# Patient Record
Sex: FEMALE | Race: WHITE | NOT HISPANIC OR LATINO | Employment: FULL TIME | URBAN - NONMETROPOLITAN AREA
[De-identification: names, ages, dates, MRNs, and addresses within clinical notes are randomized per-mention and may not be internally consistent; named-entity substitution may affect disease eponyms.]

---

## 2017-02-13 ENCOUNTER — OFFICE VISIT (OUTPATIENT)
Dept: URGENT CARE | Facility: PHYSICIAN GROUP | Age: 32
End: 2017-02-13

## 2017-02-13 VITALS
TEMPERATURE: 97.2 F | RESPIRATION RATE: 16 BRPM | SYSTOLIC BLOOD PRESSURE: 122 MMHG | BODY MASS INDEX: 27.43 KG/M2 | OXYGEN SATURATION: 97 % | DIASTOLIC BLOOD PRESSURE: 80 MMHG | HEART RATE: 84 BPM | WEIGHT: 150 LBS

## 2017-02-13 DIAGNOSIS — R19.7 DIARRHEA, UNSPECIFIED TYPE: ICD-10-CM

## 2017-02-13 PROCEDURE — 99213 OFFICE O/P EST LOW 20 MIN: CPT | Performed by: PHYSICIAN ASSISTANT

## 2017-02-13 NOTE — MR AVS SNAPSHOT
Hannah Winters   2017 10:35 AM   Office Visit   MRN: [id number]    Department:  [facility]   Dept Phone:  [phone]    Description:  Female : 1985   Provider:  Caren Valdez PA-C           Reason for Visit     Diarrhea           Allergies as of 2017     No Known Allergies      You were diagnosed with     Diarrhea, unspecified type   [[id number]]         Vital Signs     Blood Pressure Pulse Temperature Respirations Weight Oxygen Saturation    122/80 mmHg 84 36.2 °C (97.2 °F) 16 68.04 kg (150 lb) 97%    Smoking Status                   Current Every Day Smoker           Basic Information     Date Of Birth Sex Race Ethnicity Preferred Language    1985 Female White Non- English      Health Maintenance        Date Due Completion Dates    IMM DTaP/Tdap/Td Vaccine (1 - Tdap) 2005 ---    PAP SMEAR 2007 ---    IMM INFLUENZA (1) 2016 ---            Current Immunizations     No immunizations on file.      Below and/or attached are the medications your provider expects you to take. Review all of your home medications and newly ordered medications with your provider and/or pharmacist. Follow medication instructions as directed by your provider and/or pharmacist. Please keep your medication list with you and share with your provider. Update the information when medications are discontinued, doses are changed, or new medications (including over-the-counter products) are added; and carry medication information at all times in the event of emergency situations     Allergies:  No Known Allergies          Medications  Valid as of: 2017 - 11:13 AM    Generic Name Brand Name Tablet Size Instructions for use    Cephalexin (Cap) KEFLEX 500 MG Take 1 Cap by mouth 4 times a day.        Cyclobenzaprine HCl (Tab) FLEXERIL 10 MG Take 1 Tab by mouth 3 times a day as needed for Mild Pain.        Ibuprofen (Tab) MOTRIN 800 MG Take 1 Tab by mouth every 8 hours as needed  for Mild Pain.        Oxycodone-Acetaminophen (Tab) PERCOCET 5-325 MG Take 1-2 Tabs by mouth every four hours as needed.        Oxycodone-Acetaminophen (Tab) PERCOCET 5-325 MG Take 1-2 Tabs by mouth every 6 hours as needed (moderate to severe pain).        Oxycodone-Acetaminophen (Tab) PERCOCET 5-325 MG Take 1-2 Tabs by mouth every 6 hours as needed.        .                 Medicines prescribed today were sent to:     [address], [place] [address] [facility]    [address] [facility] [place] [address]    Phone: [phone] Fax: [phone]    Open 24 Hours?: No      Medication refill instructions:       If your prescription bottle indicates you have medication refills left, it is not necessary to call your provider’s office. Please contact your pharmacy and they will refill your medication.    If your prescription bottle indicates you do not have any refills left, you may request refills at any time through one of the following ways: The online [employer] system (except Urgent Care), by calling your provider’s office, or by asking your pharmacy to contact your provider’s office with a refill request. Medication refills are processed only during regular business hours and may not be available until the next business day. Your provider may request additional information or to have a follow-up visit with you prior to refilling your medication.   *Please Note: Medication refills are assigned a new Rx number when refilled electronically. Your pharmacy may indicate that no refills were authorized even though a new prescription for the same medication is available at the pharmacy. Please request the medicine by name with the pharmacy before contacting your provider for a refill.        Your To Do List     Future Labs/Procedures Complete By Expires    CRYPTO/GIARDIA RAPID ASSAY  As directed 8/13/2017         [employer] Access Code: [email or web address]  Expires: 3/15/2017 11:13 AM    Your email address is  not on file at [facility].  Email Addresses are required for you to sign up for My[employer], please contact [phone] to verify your personal information and to provide your email address prior to attempting to register for MyUnfold.    [place] [address]    [employer]  A secure, online tool to manage your health information     [facility]’s MyUnfold® is a secure, online tool that connects you to your personalized health information from the privacy of your home -- day or night - making it very easy for you to manage your healthcare. Once the activation process is completed, you can even access your medical information using the MyUnfold jena, which is available for free in the Apple Jena store or Google Play store.     To learn more about MyUnfold, visit [email or web address]    There are two levels of access available (as shown below):   My Chart Features  [facility] Primary Care Doctor [facility]  Specialists [facility]  Urgent  Care Non-[facility] Primary Care Doctor   Email your healthcare team securely and privately 24/7 X X X    Manage appointments: schedule your next appointment; view details of past/upcoming appointments X      Request prescription refills. X      View recent personal medical records, including lab and immunizations X X X X   View health record, including health history, allergies, medications X X X X   Read reports about your outpatient visits, procedures, consult and ER notes X X X X   See your discharge summary, which is a recap of your hospital and/or ER visit that includes your diagnosis, lab results, and care plan X X  X     How to register for MyUnfold:  Once your e-mail address has been verified, follow the following steps to sign up for MyUnfold.     1. Go to  [email or web address]  2. Click on the Sign Up Now box, which takes you to the New Member Sign Up page. You will need to provide the following information:  a. Enter your MyUnfold Access Code exactly as  it appears at the top of this page. (You will not need to use this code after you’ve completed the sign-up process. If you do not sign up before the expiration date, you must request a new code.)   b. Enter your date of birth.   c. Enter your home email address.   d. Click Submit, and follow the next screen’s instructions.  3. Create a [employer] ID. This will be your [employer] login ID and cannot be changed, so think of one that is secure and easy to remember.  4. Create a [employer] password. You can change your password at any time.  5. Enter your Password Reset Question and Answer. This can be used at a later time if you forget your password.   6. Enter your e-mail address. This allows you to receive e-mail notifications when new information is available in [employer].  7. Click Sign Up. You can now view your health information.    For assistance activating your [employer] account, call [phone]

## 2017-02-13 NOTE — Clinical Note
February 13, 2017         Patient: Hannah Winters   YOB: 1985   Date of Visit: 2/13/2017           To Whom it May Concern:    Hannah Winters was seen in my clinic on 2/13/2017. She may return to work on 2/14/17.    If you have any questions or concerns, please don't hesitate to call.        Sincerely,           Caren Valdez PA-C  Electronically Signed

## 2017-02-13 NOTE — PROGRESS NOTES
Chief Complaint   Patient presents with   • Diarrhea       HISTORY OF PRESENT ILLNESS: Patient is a 31 y.o. female who presents today for evaluation of a 3 day history of loose stool. Patient states she had very small formed stool yesterday but yesterday evening started having watery stool again. She denies any blood in her stool. She denies recent travel, antibiotics, and bad food or water sources that she knows of. Her boyfriend was sick with the same symptoms one day prior to her symptom onset. She denies localized abdominal pain, nausea, vomiting. She has not tried any over-the-counter medication. Her LMP was partially 3 weeks ago.    There are no active problems to display for this patient.      Allergies:Review of patient's allergies indicates no known allergies.    Current Outpatient Prescriptions Ordered in [facility]   Medication Sig Dispense Refill   • ibuprofen (MOTRIN) 800 MG TABS Take 1 Tab by mouth every 8 hours as needed for Mild Pain. 30 Tab 0   • oxycodone-acetaminophen (PERCOCET) 5-325 MG TABS Take 1-2 Tabs by mouth every 6 hours as needed. 15 Tab 0   • cyclobenzaprine (FLEXERIL) 10 MG TABS Take 1 Tab by mouth 3 times a day as needed for Mild Pain. 15 Tab 0   • oxycodone-acetaminophen (PERCOCET) 5-325 MG TABS Take 1-2 Tabs by mouth every 6 hours as needed (moderate to severe pain). 10 Each 0   • cephALEXin (KEFLEX) 500 MG CAPS Take 1 Cap by mouth 4 times a day. 40 Cap 0   • oxycodone-acetaminophen (PERCOCET) 5-325 MG TABS Take 1-2 Tabs by mouth every four hours as needed. 15 Tab 0     No current Epic-ordered facility-administered medications on file.       History reviewed. No pertinent past medical history.    Social History   Substance Use Topics   • Smoking status: Current Every Day Smoker -- 0.50 packs/day for 6 years     Types: Cigarettes   • Smokeless tobacco: None   • Alcohol Use: No       No family status information on file.   History reviewed. No pertinent family history.    ROS:   Review of  Systems   Constitutional: Negative for fever, chills, weight loss and malaise/fatigue.   HENT: Negative for ear pain, nosebleeds, congestion, sore throat and neck pain.    Eyes: Negative for blurred vision.   Respiratory: Negative for cough, sputum production, shortness of breath and wheezing.    Cardiovascular: Negative for chest pain, palpitations, orthopnea and leg swelling.   Gastrointestinal: Negative for heartburn, nausea, vomiting..   Genitourinary: Negative for dysuria, urgency and frequency.       Exam:  Blood pressure 122/80, pulse 84, temperature 36.2 °C (97.2 °F), resp. rate 16, weight 68.04 kg (150 lb), SpO2 97 %.  General: Normal appearing. No distress.  HEENT:  Head is grossly normal.  Pulmonary: Clear to ausculation and percussion.  Normal effort. No rales, ronchi, or wheezing.   Cardiovascular: Regular rate and rhythm without murmur.    Back: No CVA tenderness noted.  Abdomen: Soft, nondistended, nontender to palpation.  Neurologic: Grossly nonfocal.  Skin: No obvious lesions.  Psych: Normal mood. Alert and oriented x3. Judgment and insight is normal.     Assessment/Plan:  Discussed likely viral etiology. Discussed appropriate over-the-counter symptomatic medication, and when to return to clinic.  Drink plenty of fluids. Stool studies for worsening or persistent symptoms.  1. Diarrhea, unspecified type  CRYPTO/GIARDIA RAPID ASSAY    CULTURE STOOL    C DIFFICILE TOXINS A+B, EIA

## 2017-09-06 ENCOUNTER — HOSPITAL ENCOUNTER (OUTPATIENT)
Dept: HOSPITAL 8 - CFH | Age: 32
Discharge: HOME | End: 2017-09-06
Attending: NURSE PRACTITIONER
Payer: COMMERCIAL

## 2017-09-06 DIAGNOSIS — Z3A.00: ICD-10-CM

## 2017-09-06 DIAGNOSIS — O46.8X1: Primary | ICD-10-CM

## 2017-09-06 PROCEDURE — 76801 OB US < 14 WKS SINGLE FETUS: CPT

## 2018-07-09 ENCOUNTER — HOSPITAL ENCOUNTER (OUTPATIENT)
Dept: HOSPITAL 8 - LDOP | Age: 33
Discharge: HOME | End: 2018-07-09
Attending: OBSTETRICS & GYNECOLOGY
Payer: MEDICAID

## 2018-07-09 VITALS — HEIGHT: 62 IN | WEIGHT: 178.35 LBS | BODY MASS INDEX: 32.82 KG/M2

## 2018-07-09 VITALS — SYSTOLIC BLOOD PRESSURE: 130 MMHG | DIASTOLIC BLOOD PRESSURE: 62 MMHG

## 2018-07-09 DIAGNOSIS — R51: ICD-10-CM

## 2018-07-09 DIAGNOSIS — O26.893: ICD-10-CM

## 2018-07-09 DIAGNOSIS — O13.3: Primary | ICD-10-CM

## 2018-07-09 DIAGNOSIS — Z3A.35: ICD-10-CM

## 2018-07-09 PROCEDURE — 99211 OFF/OP EST MAY X REQ PHY/QHP: CPT

## 2018-07-09 PROCEDURE — G0463 HOSPITAL OUTPT CLINIC VISIT: HCPCS

## 2018-07-09 PROCEDURE — 59025 FETAL NON-STRESS TEST: CPT

## 2018-08-16 ENCOUNTER — HOSPITAL ENCOUNTER (INPATIENT)
Dept: HOSPITAL 8 - LDOP | Age: 33
LOS: 2 days | Discharge: HOME | End: 2018-08-18
Attending: OBSTETRICS & GYNECOLOGY | Admitting: OBSTETRICS & GYNECOLOGY
Payer: MEDICAID

## 2018-08-16 VITALS — WEIGHT: 167.33 LBS | BODY MASS INDEX: 30.79 KG/M2 | HEIGHT: 62 IN

## 2018-08-16 VITALS — DIASTOLIC BLOOD PRESSURE: 69 MMHG | SYSTOLIC BLOOD PRESSURE: 119 MMHG

## 2018-08-16 VITALS — DIASTOLIC BLOOD PRESSURE: 72 MMHG | SYSTOLIC BLOOD PRESSURE: 153 MMHG

## 2018-08-16 VITALS — SYSTOLIC BLOOD PRESSURE: 153 MMHG | DIASTOLIC BLOOD PRESSURE: 80 MMHG

## 2018-08-16 DIAGNOSIS — Z83.3: ICD-10-CM

## 2018-08-16 DIAGNOSIS — Z3A.41: ICD-10-CM

## 2018-08-16 DIAGNOSIS — O34.211: Primary | ICD-10-CM

## 2018-08-16 DIAGNOSIS — O48.0: ICD-10-CM

## 2018-08-16 DIAGNOSIS — Z80.3: ICD-10-CM

## 2018-08-16 LAB
BASOPHILS # BLD AUTO: 0.09 X10^3/UL (ref 0–0.1)
BASOPHILS NFR BLD AUTO: 1 % (ref 0–1)
EOSINOPHIL # BLD AUTO: 0 X10^3/UL (ref 0–0.4)
EOSINOPHIL NFR BLD AUTO: 0 % (ref 1–7)
ERYTHROCYTE [DISTWIDTH] IN BLOOD BY AUTOMATED COUNT: 13.7 % (ref 9.6–15.2)
LYMPHOCYTES # BLD AUTO: 1.82 X10^3/UL (ref 1–3.4)
LYMPHOCYTES NFR BLD AUTO: 16 % (ref 22–44)
MCH RBC QN AUTO: 30.7 PG (ref 27–34.8)
MCHC RBC AUTO-ENTMCNC: 33.5 G/DL (ref 32.4–35.8)
MCV RBC AUTO: 91.7 FL (ref 80–100)
MD: NO
MONOCYTES # BLD AUTO: 0.49 X10^3/UL (ref 0.2–0.8)
MONOCYTES NFR BLD AUTO: 4 % (ref 2–9)
NEUTROPHILS # BLD AUTO: 9.32 X10^3/UL (ref 1.8–6.8)
NEUTROPHILS NFR BLD AUTO: 80 % (ref 42–75)
PLATELET # BLD AUTO: 164 X10^3/UL (ref 130–400)
PMV BLD AUTO: 10.4 FL (ref 7.4–10.4)
RBC # BLD AUTO: 4.02 X10^6/UL (ref 3.82–5.3)

## 2018-08-16 PROCEDURE — 36415 COLL VENOUS BLD VENIPUNCTURE: CPT

## 2018-08-16 PROCEDURE — 85025 COMPLETE CBC W/AUTO DIFF WBC: CPT

## 2018-08-16 PROCEDURE — 86900 BLOOD TYPING SEROLOGIC ABO: CPT

## 2018-08-16 PROCEDURE — 82803 BLOOD GASES ANY COMBINATION: CPT

## 2018-08-16 PROCEDURE — 86850 RBC ANTIBODY SCREEN: CPT

## 2018-08-16 RX ADMIN — KETOROLAC TROMETHAMINE SCH MG: 30 INJECTION, SOLUTION INTRAMUSCULAR at 23:10

## 2018-08-16 RX ADMIN — SODIUM CHLORIDE, SODIUM LACTATE, POTASSIUM CHLORIDE, AND CALCIUM CHLORIDE SCH MLS/HR: .6; .31; .03; .02 INJECTION, SOLUTION INTRAVENOUS at 14:30

## 2018-08-16 RX ADMIN — HYDROCODONE BITARTRATE AND ACETAMINOPHEN PRN TAB: 5; 325 TABLET ORAL at 22:01

## 2018-08-16 RX ADMIN — Medication SCH MLS/HR: at 20:31

## 2018-08-16 RX ADMIN — SODIUM CHLORIDE, SODIUM LACTATE, POTASSIUM CHLORIDE, AND CALCIUM CHLORIDE SCH MLS/HR: .6; .31; .03; .02 INJECTION, SOLUTION INTRAVENOUS at 20:31

## 2018-08-16 RX ADMIN — SODIUM CHLORIDE, SODIUM LACTATE, POTASSIUM CHLORIDE, AND CALCIUM CHLORIDE SCH MLS/HR: .6; .31; .03; .02 INJECTION, SOLUTION INTRAVENOUS at 18:09

## 2018-08-16 RX ADMIN — KETOROLAC TROMETHAMINE SCH MG: 30 INJECTION, SOLUTION INTRAMUSCULAR at 20:15

## 2018-08-17 VITALS — DIASTOLIC BLOOD PRESSURE: 75 MMHG | SYSTOLIC BLOOD PRESSURE: 149 MMHG

## 2018-08-17 VITALS — DIASTOLIC BLOOD PRESSURE: 71 MMHG | SYSTOLIC BLOOD PRESSURE: 124 MMHG

## 2018-08-17 VITALS — SYSTOLIC BLOOD PRESSURE: 127 MMHG | DIASTOLIC BLOOD PRESSURE: 60 MMHG

## 2018-08-17 VITALS — DIASTOLIC BLOOD PRESSURE: 83 MMHG | SYSTOLIC BLOOD PRESSURE: 135 MMHG

## 2018-08-17 VITALS — DIASTOLIC BLOOD PRESSURE: 74 MMHG | SYSTOLIC BLOOD PRESSURE: 152 MMHG

## 2018-08-17 LAB
BASOPHILS # BLD AUTO: 0.05 X10^3/UL (ref 0–0.1)
BASOPHILS NFR BLD AUTO: 0 % (ref 0–1)
EOSINOPHIL # BLD AUTO: 0 X10^3/UL (ref 0–0.4)
EOSINOPHIL NFR BLD AUTO: 0 % (ref 1–7)
ERYTHROCYTE [DISTWIDTH] IN BLOOD BY AUTOMATED COUNT: 13.8 % (ref 9.6–15.2)
LYMPHOCYTES # BLD AUTO: 2.29 X10^3/UL (ref 1–3.4)
LYMPHOCYTES NFR BLD AUTO: 16 % (ref 22–44)
MCH RBC QN AUTO: 32.1 PG (ref 27–34.8)
MCHC RBC AUTO-ENTMCNC: 34.5 G/DL (ref 32.4–35.8)
MCV RBC AUTO: 92.9 FL (ref 80–100)
MD: NO
MONOCYTES # BLD AUTO: 0.58 X10^3/UL (ref 0.2–0.8)
MONOCYTES NFR BLD AUTO: 4 % (ref 2–9)
NEUTROPHILS # BLD AUTO: 11.81 X10^3/UL (ref 1.8–6.8)
NEUTROPHILS NFR BLD AUTO: 80 % (ref 42–75)
PLATELET # BLD AUTO: 140 X10^3/UL (ref 130–400)
PMV BLD AUTO: 10 FL (ref 7.4–10.4)
RBC # BLD AUTO: 3.43 X10^6/UL (ref 3.82–5.3)

## 2018-08-17 RX ADMIN — KETOROLAC TROMETHAMINE SCH MG: 30 INJECTION, SOLUTION INTRAMUSCULAR at 11:43

## 2018-08-17 RX ADMIN — SODIUM CHLORIDE, SODIUM LACTATE, POTASSIUM CHLORIDE, AND CALCIUM CHLORIDE SCH MLS/HR: .6; .31; .03; .02 INJECTION, SOLUTION INTRAVENOUS at 04:31

## 2018-08-17 RX ADMIN — Medication SCH MLS/HR: at 16:31

## 2018-08-17 RX ADMIN — KETOROLAC TROMETHAMINE SCH MG: 30 INJECTION, SOLUTION INTRAMUSCULAR at 17:49

## 2018-08-17 RX ADMIN — Medication SCH MLS/HR: at 06:31

## 2018-08-17 RX ADMIN — HYDROCODONE BITARTRATE AND ACETAMINOPHEN PRN TAB: 5; 325 TABLET ORAL at 20:05

## 2018-08-17 RX ADMIN — SODIUM CHLORIDE, SODIUM LACTATE, POTASSIUM CHLORIDE, AND CALCIUM CHLORIDE SCH MLS/HR: .6; .31; .03; .02 INJECTION, SOLUTION INTRAVENOUS at 06:31

## 2018-08-17 RX ADMIN — HYDROCODONE BITARTRATE AND ACETAMINOPHEN PRN TAB: 5; 325 TABLET ORAL at 03:54

## 2018-08-17 RX ADMIN — HYDROCODONE BITARTRATE AND ACETAMINOPHEN PRN TAB: 5; 325 TABLET ORAL at 12:03

## 2018-08-17 RX ADMIN — HYDROCODONE BITARTRATE AND ACETAMINOPHEN PRN TAB: 5; 325 TABLET ORAL at 07:59

## 2018-08-17 RX ADMIN — KETOROLAC TROMETHAMINE SCH MG: 30 INJECTION, SOLUTION INTRAMUSCULAR at 05:22

## 2018-08-17 RX ADMIN — DOCUSATE SODIUM PRN MG: 100 CAPSULE, LIQUID FILLED ORAL at 20:05

## 2018-08-17 RX ADMIN — DOCUSATE SODIUM PRN MG: 100 CAPSULE, LIQUID FILLED ORAL at 07:59

## 2018-08-17 RX ADMIN — PRENATAL VIT W/ FE FUMARATE-FA TAB 27-0.8 MG SCH EACH: 27-0.8 TAB at 07:59

## 2018-08-17 RX ADMIN — SODIUM CHLORIDE, SODIUM LACTATE, POTASSIUM CHLORIDE, AND CALCIUM CHLORIDE SCH MLS/HR: .6; .31; .03; .02 INJECTION, SOLUTION INTRAVENOUS at 16:31

## 2018-08-17 RX ADMIN — KETOROLAC TROMETHAMINE SCH MG: 30 INJECTION, SOLUTION INTRAMUSCULAR at 23:31

## 2018-08-17 RX ADMIN — SODIUM CHLORIDE, SODIUM LACTATE, POTASSIUM CHLORIDE, AND CALCIUM CHLORIDE SCH MLS/HR: .6; .31; .03; .02 INJECTION, SOLUTION INTRAVENOUS at 12:31

## 2018-08-18 VITALS — DIASTOLIC BLOOD PRESSURE: 70 MMHG | SYSTOLIC BLOOD PRESSURE: 132 MMHG

## 2018-08-18 RX ADMIN — KETOROLAC TROMETHAMINE SCH MG: 30 INJECTION, SOLUTION INTRAMUSCULAR at 05:05

## 2018-08-18 RX ADMIN — PRENATAL VIT W/ FE FUMARATE-FA TAB 27-0.8 MG SCH EACH: 27-0.8 TAB at 09:26

## 2018-08-18 RX ADMIN — KETOROLAC TROMETHAMINE SCH MG: 30 INJECTION, SOLUTION INTRAMUSCULAR at 11:00

## 2018-08-18 RX ADMIN — HYDROCODONE BITARTRATE AND ACETAMINOPHEN PRN TAB: 5; 325 TABLET ORAL at 05:05

## 2018-08-18 RX ADMIN — HYDROCODONE BITARTRATE AND ACETAMINOPHEN PRN TAB: 5; 325 TABLET ORAL at 13:19

## 2018-08-18 RX ADMIN — DOCUSATE SODIUM PRN MG: 100 CAPSULE, LIQUID FILLED ORAL at 09:26

## 2019-05-17 ENCOUNTER — OCCUPATIONAL MEDICINE (OUTPATIENT)
Dept: URGENT CARE | Facility: PHYSICIAN GROUP | Age: 34
End: 2019-05-17
Payer: MEDICAID

## 2019-05-17 ENCOUNTER — APPOINTMENT (OUTPATIENT)
Dept: RADIOLOGY | Facility: IMAGING CENTER | Age: 34
End: 2019-05-17
Attending: NURSE PRACTITIONER
Payer: MEDICAID

## 2019-05-17 VITALS
DIASTOLIC BLOOD PRESSURE: 76 MMHG | OXYGEN SATURATION: 97 % | HEIGHT: 62 IN | WEIGHT: 181 LBS | HEART RATE: 67 BPM | SYSTOLIC BLOOD PRESSURE: 124 MMHG | TEMPERATURE: 98.2 F | BODY MASS INDEX: 33.31 KG/M2 | RESPIRATION RATE: 16 BRPM

## 2019-05-17 DIAGNOSIS — S96.911A STRAIN OF RIGHT ANKLE AND FOOT, INITIAL ENCOUNTER: ICD-10-CM

## 2019-05-17 DIAGNOSIS — M79.671 RIGHT FOOT PAIN: ICD-10-CM

## 2019-05-17 DIAGNOSIS — Z02.1 PRE-EMPLOYMENT DRUG SCREENING: ICD-10-CM

## 2019-05-17 LAB
AMP AMPHETAMINE: NORMAL
BREATH ALCOHOL COMMENT: NORMAL
COC COCAINE: NORMAL
INT CON NEG: NORMAL
INT CON POS: NORMAL
MET METHAMPHETAMINES: NORMAL
OPI OPIATES: NORMAL
PCP PHENCYCLIDINE: NORMAL
POC BREATHALIZER: 0 PERCENT (ref 0–0.01)
POC DRUG COMMENT 753798-OCCUPATIONAL HEALTH: NORMAL
THC: NORMAL

## 2019-05-17 PROCEDURE — 99214 OFFICE O/P EST MOD 30 MIN: CPT | Performed by: NURSE PRACTITIONER

## 2019-05-17 PROCEDURE — 82075 ASSAY OF BREATH ETHANOL: CPT | Performed by: NURSE PRACTITIONER

## 2019-05-17 PROCEDURE — 80305 DRUG TEST PRSMV DIR OPT OBS: CPT | Performed by: NURSE PRACTITIONER

## 2019-05-17 PROCEDURE — 73610 X-RAY EXAM OF ANKLE: CPT | Mod: TC,RT | Performed by: PHYSICIAN ASSISTANT

## 2019-05-17 ASSESSMENT — ENCOUNTER SYMPTOMS
WHEEZING: 0
CHILLS: 0
FEVER: 0
ROS SKIN COMMENTS: NO AREA OF ABRASION
SHORTNESS OF BREATH: 0
FALLS: 0

## 2019-05-17 ASSESSMENT — PAIN SCALES - GENERAL: PAINLEVEL: 7=MODERATE-SEVERE PAIN

## 2019-05-17 NOTE — LETTER
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    Medical Information   Related to Industrial Injury? No    Subjective Complaints:  DOI 5/16/2019: Patient states was walking at work and lateral edge of her right foot suddenly started hurting. Denies injury mechanism and denies rolling ankle or falling. States pain starts from lateral edge of right foot and radiates to right lateral ankle and heel. States with mild tingling at times. Has not tried anything for relief. Denies second job or prior injury to the area. Pain worsened when walking for prolonged periods.     Objective Findings: Right foot/ankle: ROM right ankle normal. No area of redness, swelling or bruising noted. Cap refill <2. Tenderness to palpation lateral aspect of right heel and dorsal surface of foot. Mild pain in heel with plantar flexion.      Pre-Existing Condition(s): None   Assessment:   Initial Visit    Status: Additional Care Required  Permanent Disability:No    Plan: Diagnostics    Diagnostics: X-ray    Comments:  Xray with no acute fracture. With mild soft tissue swelling.    Disability Information   Status: Released to Restricted Duty    From:  5/17/2019  Through: 5/20/2019 Restrictions are: Temporary   Physical Restrictions   Sitting:    Standing:  < or = to 6 hrs/day Stooping:    Bending:      Squatting:    Walking:  < or = to 6 hrs/day  Climbing:  < or = to 4 hrs/day Pushing:      Pulling:    Other:    Reaching Above Shoulder (L):   Reaching Above Shoulder (R):       Reaching Below Shoulder (L):    Reaching Below Shoulder (R):      Not to exceed Weight Limits   Carrying(hrs):   Weight Limit(lb):   Lifting(hrs):   Weight  Limit(lb):     Comments: Advised to rest, ice, and elevate extremity. Must wear ace wrap while working and advised to obtain shoes with arch support, as this could be contributing factor.  May take over-the-counter Ibuprofen 400-800 mg every 8 hours as needed for pain      Repetitive Actions   Hands: i.e. Fine Manipulations from Grasping:     Feet: i.e. Operating Foot Controls:     Driving / Operate Machinery:     Physician Name: CJ Dorsey Physician Signature: KAYE Preciado e-Signature: Dr. Minor Martinez, Medical Director   Clinic Name / Location: [address] [address] Clinic Phone Number: Dept: [phone]   Appointment Time: 11:30 Am Visit Start Time: 11:47 AM   Check-In Time:  11:43 Am Visit Discharge Time:  1:11pm   Original-Treating Physician or Chiropractor    Page 2-Insurer/TPA    Page 3-Employer    Page 4-Employee

## 2019-05-17 NOTE — PROGRESS NOTES
Subjective:   Hannah Winters is a 33 y.o. female who presents for Ankle Injury (NEW WC )    DOI 5/16/2019: Patient states was walking at work and lateral edge of her right foot suddenly started hurting. Denies injury mechanism and denies rolling ankle or falling. States pain starts from lateral edge of right foot and radiates to right lateral ankle and heel. States with mild tingling at times. Has not tried anything for relief. Denies second job or prior injury to the area. Pain worsened when walking for prolonged periods.     HPI     Review of Systems   Constitutional: Negative for chills and fever.   Respiratory: Negative for shortness of breath and wheezing.    Cardiovascular: Negative for chest pain.   Musculoskeletal: Negative for falls and joint pain.        Right foot/heel pain   Skin:        No area of abrasion     PMH:  has no past medical history on file.  MEDS:   Current Outpatient Prescriptions:   •  ibuprofen (MOTRIN) 800 MG TABS, Take 1 Tab by mouth every 8 hours as needed for Mild Pain., Disp: 30 Tab, Rfl: 0  •  oxycodone-acetaminophen (PERCOCET) 5-325 MG TABS, Take 1-2 Tabs by mouth every 6 hours as needed., Disp: 15 Tab, Rfl: 0  •  cyclobenzaprine (FLEXERIL) 10 MG TABS, Take 1 Tab by mouth 3 times a day as needed for Mild Pain., Disp: 15 Tab, Rfl: 0  •  oxycodone-acetaminophen (PERCOCET) 5-325 MG TABS, Take 1-2 Tabs by mouth every 6 hours as needed (moderate to severe pain)., Disp: 10 Each, Rfl: 0  •  cephALEXin (KEFLEX) 500 MG CAPS, Take 1 Cap by mouth 4 times a day., Disp: 40 Cap, Rfl: 0  •  oxycodone-acetaminophen (PERCOCET) 5-325 MG TABS, Take 1-2 Tabs by mouth every four hours as needed., Disp: 15 Tab, Rfl: 0  ALLERGIES: No Known Allergies  SURGHX: No past surgical history on file.  SOCHX:  reports that she has been smoking Cigarettes.  She has a 3.00 pack-year smoking history. She does not have any smokeless tobacco history on file. She reports that she does not drink alcohol or use  "drugs.  FH: Family history was reviewed, no pertinent findings to report     Objective:   /76   Pulse 67   Temp 36.8 °C (98.2 °F) (Temporal)   Resp 16   Ht 1.575 m (5' 2\")   Wt 82.1 kg (181 lb)   SpO2 97%   BMI 33.11 kg/m²   Physical Exam   Constitutional: She appears well-developed and well-nourished. No distress.   HENT:   Head: Normocephalic.   Right Ear: Hearing normal.   Left Ear: Hearing normal.   Nose: Nose normal.   Eyes: Pupils are equal, round, and reactive to light. Conjunctivae, EOM and lids are normal.   Neck: Normal range of motion.   Cardiovascular: Normal rate, regular rhythm and normal heart sounds.    Pulmonary/Chest: Effort normal and breath sounds normal. No respiratory distress. She has no decreased breath sounds. She has no wheezes.   Musculoskeletal:        Right foot: There is tenderness. There is normal range of motion and no swelling.        Feet:    See comments below   Neurological: She is alert.   Skin: Skin is warm. Capillary refill takes less than 2 seconds. No abrasion and no rash noted. She is not diaphoretic.   NO area of abrasion   Psychiatric: She has a normal mood and affect. Her speech is normal and behavior is normal. Judgment and thought content normal.   Vitals reviewed.    Right foot/ankle: ROM right ankle normal. No area of redness, swelling or bruising noted. Cap refill <2. Tenderness to palpation lateral aspect of right heel and dorsal surface of foot. Mild pain in heel with plantar flexion.      Assessment/Plan:   Assessment    1. Right foot pain  - DX-ANKLE 3+ VIEWS RIGHT; Future  - POCT Breath Alcohol Test    2. Pre-employment drug screening  - POCT 6 Panel Urine Drug Screen  - POCT Breath Alcohol Test    3. Strain of right ankle and foot, initial encounter    Xray negative for acute fracture, with soft tissue swelling  Restrictions per D39  May take over-the-counter Ibuprofen 400-800 mg every 8 hours as needed for pain  Ace wrap    Differential diagnosis, " natural history, supportive care, and indications for immediate follow-up discussed.

## 2019-05-17 NOTE — LETTER
"EMPLOYEE’S CLAIM FOR COMPENSATION/ REPORT OF INITIAL TREATMENT  FORM C-4    EMPLOYEE’S CLAIM - PROVIDE ALL INFORMATION REQUESTED   First Name  Hannah Last Name  Allshmary Birthdate                    1985                Sex  female Claim Number   Home Address  [address] Age  33 y.o. 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 (if applicable)  Picking-pushing cart    How did this injury or occupational disease occur? (Be specific an answer in detail. Use additional sheet if necessary)  I was picking not sure when or how I sprained my ankle   If you believe that you have an occupational disease, when did you first have knowledge of the disability and it relationship to your employment?  No Witnesses to the Accident  None      Nature of Injury or Occupational Disease  Sprain  Part(s) of Body Injured or Affected  Ankle (R), Foot (R),     I certify that the above is true and correct to the best of my knowledge and that I have provided this information in order to obtain the benefits of [place] inclusive or Chapter 617 of [place]).  I hereby authorize any physician, chiropractor, surgeon, practitioner, or other person, any hospital, including [facility] or [facility] hospital, any medical service organization, any insurance company, or other institution or organization to release to each other, any medical or other information, including benefits paid or payable, pertinent to this injury or disease, except information relative to diagnosis, treatment and/or counseling for AIDS, psychological conditions, alcohol or controlled substances, for which I must give specific authorization.  A Photostat of this authorization shall be as valid as the original.     Date   Place   Employee’s Signature   THIS REPORT MUST BE COMPLETED AND MAILED WITHIN 3 WORKING DAYS OF TREATMENT   Place  [facility]  Name of Facility  [place]   Date  5/17/2019 Diagnosis  (M79.671) Right foot pain  (Z02.1) Pre-employment drug screening  (S96.911A) Strain of right ankle and foot, initial encounter Is there evidence the injured employee was under the influence of alcohol and/or another controlled substance at the time of accident?   Hour  11:47 AM Description of Injury or Disease  Diagnoses of Right foot pain, Pre-employment drug screening, and Strain of right ankle and foot, initial encounter were pertinent to this visit. No   Treatment  Advised to rest, ice, and elevate extremity. Must wear ace wrap while working and advised to obtain shoes with arch support, as this could be contributing factor.  May take over-the-counter Ibuprofen 400-800 mg every 8 hours as needed for pain  Have you advised the patient to remain off work five days or more? No   X-Ray Findings  Negative   If Yes   From Date  To Date      From information given by the employee, together with medical evidence, can you directly connect this injury or occupational disease as job incurred?  No If No Full Duty  No Modified Duty  Yes    "  Is additional medical care by a physician indicated?  Yes If Modified Duty, Specify any Limitations / Restrictions      Do you know of any previous injury or disease contributing to this condition or occupational disease?                            No   Date  5/17/2019 Print Doctor’s Name CJ Dorsey I certify the employer’s copy of  this form was mailed on:   Address  [address] Insurer’s Use Only     [place]  [address]    Provider’s Tax ID Number  [id number] Telephone  Dept: [phone]        christelle-KAYE Rich   e-Signature: Dr. Minor Martinez, Medical Director Degree  APRN        ORIGINAL-TREATING PHYSICIAN OR CHIROPRACTOR    PAGE 2-INSURER/TPA    PAGE 3-EMPLOYER    PAGE 4-EMPLOYEE             Form C-4 (rev10/07)              BRIEF DESCRIPTION OF RIGHTS AND BENEFITS  (Pursuant to NRS 616C.050)    Notice of Injury or Occupational Disease (Incident Report Form C-1): If an injury or occupational disease (OD) arises out of and in the  course of employment, you must provide written notice to your employer as soon as practicable, but no later than 7 days after the accident or  OD. Your employer shall maintain a sufficient supply of the required forms.    Claim for Compensation (Form C-4): If medical treatment is sought, the form C-4 is available at the place of initial treatment. A completed  \"Claim for Compensation\" (Form C-4) must be filed within 90 days after an accident or OD. The treating physician or chiropractor must,  within 3 working days after treatment, complete and mail to the employer, the employer's insurer and third-party , the Claim for  Compensation.    Medical Treatment: If you require medical treatment for your on-the-job injury or OD, you may be required to select a physician or  chiropractor from a list provided by your workers’ compensation insurer, if it has contracted with an Organization for Managed Care (MCO) " or  Preferred Provider Organization (PPO) or providers of health care. If your employer has not entered into a contract with an MCO or PPO, you  may select a physician or chiropractor from the Panel of Physicians and Chiropractors. Any medical costs related to your industrial injury or  OD will be paid by your insurer.    Temporary Total Disability (TTD): If your doctor has certified that you are unable to work for a period of at least 5 consecutive days, or 5  cumulative days in a 20-day period, or places restrictions on you that your employer does not accommodate, you may be entitled to TTD  compensation.    Temporary Partial Disability (TPD): If the wage you receive upon reemployment is less than the compensation for TTD to which you are  entitled, the insurer may be required to pay you TPD compensation to make up the difference. TPD can only be paid for a maximum of 24  months.    Permanent Partial Disability (PPD): When your medical condition is stable and there is an indication of a PPD as a result of your injury or  OD, within 30 days, your insurer must arrange for an evaluation by a rating physician or chiropractor to determine the degree of your PPD. The  amount of your PPD award depends on the date of injury, the results of the PPD evaluation and your age and wage.    Permanent Total Disability (PTD): If you are medically certified by a treating physician or chiropractor as permanently and totally disabled  and have been granted a PTD status by your insurer, you are entitled to receive monthly benefits not to exceed 66 2/3% of your average  monthly wage. The amount of your PTD payments is subject to reduction if you previously received a PPD award.    Vocational Rehabilitation Services: You may be eligible for vocational rehabilitation services if you are unable to return to the job due to a  permanent physical impairment or permanent restrictions as a result of your injury or occupational  disease.    Transportation and Per Ghassan Reimbursement: You may be eligible for travel expenses and per ghassan associated with medical treatment.    Reopening: You may be able to reopen your claim if your condition worsens after claim closure.    Appeal Process: If you disagree with a written determination issued by the insurer or the insurer does not respond to your request, you may  appeal to the Department of Administration, , by following the instructions contained in your determination letter. You must  appeal the determination within 70 days from the date of the determination letter at [address], [address], [place], [place]  [address], or [address], [address], [place], [place] [address]. If you disagree with the  decision, you may appeal to the  Department of Administration, . You must file your appeal within 30 days from the date of the  decision  letter at [address], [address], [place], [place] [address], or [address], [place], [place] [address]. If you  disagree with a decision of an , you may file a petition for judicial review with the District Court. You must do so within 30  days of the Appeal Officer’s decision. You may be represented by an  at your own expense or you may contact the [facility] for possible  representation.    [place]  for Injured Workers (NAIW): If you disagree with a  decision, you may request that NAIW represent you  without charge at an  Hearing. For information regarding denial of benefits, you may contact the [facility] at: [address], [address], [place] [address], [phone], or [address] [address], [phone]    To File a Complaint with the Division: If you wish to file a complaint with the  of the Division of Industrial Relations (DIR),  please contact  the Workers’ Compensation Section, [address], [address], [place], [place] [address], telephone (625) [phone], or  [address], [address], [place], [place] [address], telephone [phone]) [phone].    For assistance with Workers’ Compensation Issues: you may contact the Office of the [facility] Consumer Health Assistance, [address] [place] [address], [address], [place], [place] [address], Toll Free 1-392.461.3791, Web site: http://govcha.[facility].nv., E-mail  Kimberly@[facility].nv.                                                                                                                                                                                                                                   __________________________________________________________________                                                                   _________________                Employee Name / Signature                                                                                                                                                       Date                                                                                                                                                                                                     D-2 (rev. 10/07)

## 2019-05-20 ENCOUNTER — OCCUPATIONAL MEDICINE (OUTPATIENT)
Dept: URGENT CARE | Facility: PHYSICIAN GROUP | Age: 34
End: 2019-05-20
Payer: MEDICAID

## 2019-05-20 VITALS
DIASTOLIC BLOOD PRESSURE: 76 MMHG | OXYGEN SATURATION: 99 % | RESPIRATION RATE: 14 BRPM | BODY MASS INDEX: 33.49 KG/M2 | TEMPERATURE: 98.4 F | WEIGHT: 182 LBS | HEART RATE: 88 BPM | HEIGHT: 62 IN | SYSTOLIC BLOOD PRESSURE: 128 MMHG

## 2019-05-20 DIAGNOSIS — S96.911D STRAIN OF RIGHT FOOT, SUBSEQUENT ENCOUNTER: ICD-10-CM

## 2019-05-20 PROCEDURE — 99213 OFFICE O/P EST LOW 20 MIN: CPT | Performed by: PHYSICIAN ASSISTANT

## 2019-05-20 ASSESSMENT — PAIN SCALES - GENERAL: PAINLEVEL: 6=MODERATE PAIN

## 2019-05-20 NOTE — LETTER
"   [facility]  [address] [facility] [facility] [place] [address]  Phone:  [phone] - Fax:  [phone]   [facility] Progress Report and Disability Certification  Date of Service: 5/20/2019   No Show:  No  Date / Time of Next Visit: 5/24/2019   Claim Information   Patient Name: Hannah Winters  Claim Number:     Employer: [employer]  Date of Injury: 5/16/2019     Insurer / TPA: Eliud [employer]  ID / SSN:     Occupation: [employer]  Diagnosis: The encounter diagnosis was Strain of right foot, subsequent encounter.    Medical Information   Related to Industrial Injury?   Comments:no PMH, no memorable injury but w/ swelling and pt is adamant, suspect did roll ankle at work      Subjective Complaints:  DOI: 5/17/2019  Pt notes suspected ankle rolling injury at work, she denies memorable injury but notes \" I did not have the pain before I came to work that day\".  She denies past medical history of ankle surgery or injury.  She denies significant or memorable traumatic injury.  She approximates 45% improvement of her last 3 days.  She has been \"resting in bed\".  She took some ibuprofen.  She has not tried icing.  She has been compliant with Ace wrap and work restrictions.  She denies other employment currently.   Objective Findings: Gen: AOx3; Head: NC AT; Eyes: PERRLA/EOM; Lungs: NLR; Cardiac: RR by periph pulse exam; right ankle: Grossly normal with possible trace effusion, no erythema ecchymosis or edema noted, full active range of motion, normal strength to resistance, mild lateral sided bimalleolar and ATFL tenderness to palpation, no medial or fifth metatarsal head tenderness to palpation; neuro: N VID, brisk capillary refill throughout, normal sensation to light touch, +2 dorsalis pedis pulse   Pre-Existing Condition(s):     Assessment:   Condition Improved    Status: Additional Care Required  Permanent Disability:No    Plan:   Comments:Restricted duty with " limited standing and walking to 6 hours daily, over-the-counter anti-inflammatories, ice, rest, follow-up in 5 days for repeat evaluation     Diagnostics:      Comments:       Disability Information   Status: Released to Restricted Duty    From:  5/20/2019  Through: 5/24/2019 Restrictions are: Temporary   Physical Restrictions   Sitting:    Standing:  < or = to 6 hrs/day Stooping:    Bending:      Squatting:    Walking:  < or = to 6 hrs/day Climbing:    Pushing:      Pulling:    Other:    Reaching Above Shoulder (L):   Reaching Above Shoulder (R):       Reaching Below Shoulder (L):    Reaching Below Shoulder (R):      Not to exceed Weight Limits   Carrying(hrs):   Weight Limit(lb):   Lifting(hrs):   Weight  Limit(lb):     Comments: Restricted duty with limited standing and walking to 6 hours daily, over-the-counter anti-inflammatories, ice, rest, follow-up in 5 days for repeat evaluation    Repetitive Actions   Hands: i.e. Fine Manipulations from Grasping:     Feet: i.e. Operating Foot Controls:     Driving / Operate Machinery:     Physician Name: Marc Sheikh P.A.-C. Physician Signature: MARC Putnam P.A.-C. e-Signature: Dr. Minor Martinez, Medical Director   Clinic Name / Location: [address] [address] Clinic Phone Number: Dept: [phone]   Appointment Time: 1:20 Pm Visit Start Time: 1:35 PM   Check-In Time:  1:12 Pm Visit Discharge Time:  2:11pm   Original-Treating Physician or Chiropractor    Page 2-Insurer/TPA    Page 3-Employer    Page 4-Employee

## 2019-05-20 NOTE — PROGRESS NOTES
"Subjective:      Hannah Winters is a 33 y.o. female who presents with Follow-Up ( FV for R ankle and foot/ )      DOI: 5/17/2019  Pt notes suspected ankle rolling injury at work, she denies memorable injury but notes \" I did not have the pain before I came to work that day\".  She denies past medical history of ankle surgery or injury.  She denies significant or memorable traumatic injury.  She approximates 45% improvement of her last 3 days.  She has been \"resting in bed\".  She took some ibuprofen.  She has not tried icing.  She has been compliant with Ace wrap and work restrictions.  She denies other employment currently.     HPI    ROS       Objective:     /76   Pulse 88   Temp 36.9 °C (98.4 °F) (Temporal)   Resp 14   Ht 1.575 m (5' 2\")   Wt 82.6 kg (182 lb)   SpO2 99%   BMI 33.29 kg/m²      Physical Exam    Gen: AOx3; Head: NC AT; Eyes: PERRLA/EOM; Lungs: NLR; Cardiac: RR by periph pulse exam; right ankle: Grossly normal with possible trace effusion, no erythema ecchymosis or edema noted, full active range of motion, normal strength to resistance, mild lateral sided bimalleolar and ATFL tenderness to palpation, no medial or fifth metatarsal head tenderness to palpation; neuro: N VID, brisk capillary refill throughout, normal sensation to light touch, +2 dorsalis pedis pulse       Assessment/Plan:     1. Strain of right foot, subsequent encounter  Restricted duty with limited standing and walking to 6 hours daily, over-the-counter anti-inflammatories, ice, rest, follow-up in 5 days for repeat evaluation      "

## 2019-05-24 ENCOUNTER — OCCUPATIONAL MEDICINE (OUTPATIENT)
Dept: URGENT CARE | Facility: PHYSICIAN GROUP | Age: 34
End: 2019-05-24
Payer: MEDICAID

## 2019-05-24 VITALS
OXYGEN SATURATION: 97 % | DIASTOLIC BLOOD PRESSURE: 74 MMHG | SYSTOLIC BLOOD PRESSURE: 120 MMHG | RESPIRATION RATE: 16 BRPM | HEART RATE: 84 BPM | TEMPERATURE: 97.5 F

## 2019-05-24 DIAGNOSIS — S96.911D STRAIN OF RIGHT ANKLE, SUBSEQUENT ENCOUNTER: ICD-10-CM

## 2019-05-24 PROCEDURE — 99213 OFFICE O/P EST LOW 20 MIN: CPT | Performed by: FAMILY MEDICINE

## 2019-05-24 ASSESSMENT — ENCOUNTER SYMPTOMS: WEIGHT LOSS: 0

## 2019-05-24 NOTE — PROGRESS NOTES
Subjective:      Hannah Winters is a 33 y.o. female who presents with Follow-Up ( fv-ankle getting better still wearing brace )      DOI: 5/16/2019  F/u visit right ankle and foot strain. 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 She has improved approx 20%/foot symptoms have resolved. Continues to have 3/10 severity pain lateral ankle with weight bearing. Tolerating light duty. Using ibuprofen with some improvement. No prior injury or surgery.      HPI    Review of Systems   Constitutional: Negative for malaise/fatigue and weight loss.   Musculoskeletal:        No knee or hip pain   Skin: Negative for itching and rash.     .  Medications, Allergies, and current problem list reviewed today in Epic       Objective:     /74   Pulse 84   Temp 36.4 °C (97.5 °F)   Resp 16   SpO2 97%      Physical Exam   Constitutional: She is oriented to person, place, and time. She appears well-developed and well-nourished. No distress.   HENT:   Head: Normocephalic and atraumatic.   Neurological: She is alert and oriented to person, place, and time.   Skin: Skin is warm and dry. No rash noted.       Right ankle: area of perceived pain lateral malleolus. No deformity. Mortise stable. Distal neuro/vascular intact.        Assessment/Plan:     1. Strain of right ankle, subsequent encounter    Differential diagnosis, natural history, supportive care, and indications for immediate follow-up discussed at length.     Light duty restrictions on D 39.  Continue OTC NSAID as needed  Follow-up here in 1 week or with primary care if work comp claim is denied.

## 2019-05-24 NOTE — LETTER
[address] [place] [address]  Phone:  [phone] - Fax:  [phone]   [facility] Progress Report and Disability Certification  Date of Service: 5/24/2019   No Show:  No  Date / Time of Next Visit: 5/31/2019   Claim Information   Patient Name: Hannah Winters  Claim Number:     Employer: [employer]  Date of Injury: 5/16/2019     Insurer / TPA: Eliud Hampton  ID / SSN:     Occupation: [employer]  Diagnosis: The encounter diagnosis was Strain of right ankle, subsequent encounter.    Medical Information   Related to Industrial Injury?   Comments:indeterminate    Subjective Complaints:  DOI: 5/16/2019  F/u visit right ankle and foot strain. 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 She has improved approx 20%/foot symptoms have resolved. Continues to have 3/10 severity pain lateral ankle with weight bearing. Tolerating light duty. Using ibuprofen with some improvement. No prior injury or surgery.    Objective Findings: Right ankle: area of perceived pain lateral malleolus. No deformity. Mortise stable. Distal neuro/vascular intact.    Pre-Existing Condition(s):     Assessment:   Condition Improved    Status: Additional Care Required  Permanent Disability:No    Plan:   Comments:continue light duty, nsaid as needed    Diagnostics:      Comments:       Disability Information   Status: Released to Restricted Duty    From:  5/24/2019  Through: 5/31/2019 Restrictions are:     Physical Restrictions   Sitting:    Standing:    Stooping:    Bending:      Squatting:    Walking:  < or = to 4 hrs/day Climbing:    Pushing:      Pulling:    Other:    Reaching Above Shoulder (L):   Reaching Above Shoulder (R):       Reaching Below Shoulder (L):    Reaching Below Shoulder (R):      Not to exceed Weight Limits   Carrying(hrs):   Weight Limit(lb): < or = to 25 pounds Lifting(hrs):   Weight  Limit(lb): < or = to 25 pounds   Comments:         Repetitive Actions   Hands: i.e. Fine Manipulations from Grasping:     Feet: i.e. Operating Foot Controls:     Driving / Operate Machinery:     Physician Name: Martin White M.D. Physician Signature: MARTIN Ramires M.D. e-Signature: Dr. Minor Martinez, Medical Director   Clinic Name / Location: [address] [address] Clinic Phone Number: Dept: [phone]   Appointment Time: 10:00 Am Visit Start Time: 10:42 AM   Check-In Time:  9:52 Am Visit Discharge Time:  10:56am   Original-Treating Physician or Chiropractor    Page 2-Insurer/TPA    Page 3-Employer    Page 4-Employee

## 2019-06-14 ENCOUNTER — NON-PROVIDER VISIT (OUTPATIENT)
Dept: URGENT CARE | Facility: PHYSICIAN GROUP | Age: 34
End: 2019-06-14

## 2019-06-14 PROCEDURE — 8216 PR HAIR DRUG SCREEN: Performed by: PHYSICIAN ASSISTANT

## 2019-07-16 ENCOUNTER — OCCUPATIONAL MEDICINE (OUTPATIENT)
Dept: URGENT CARE | Facility: PHYSICIAN GROUP | Age: 34
End: 2019-07-16
Payer: MEDICAID

## 2019-07-16 VITALS
HEART RATE: 80 BPM | OXYGEN SATURATION: 97 % | SYSTOLIC BLOOD PRESSURE: 112 MMHG | WEIGHT: 183 LBS | RESPIRATION RATE: 14 BRPM | TEMPERATURE: 98.4 F | BODY MASS INDEX: 33.47 KG/M2 | DIASTOLIC BLOOD PRESSURE: 74 MMHG

## 2019-07-16 DIAGNOSIS — S96.911D STRAIN OF RIGHT ANKLE, SUBSEQUENT ENCOUNTER: ICD-10-CM

## 2019-07-16 PROCEDURE — 99213 OFFICE O/P EST LOW 20 MIN: CPT | Mod: 29 | Performed by: PHYSICIAN ASSISTANT

## 2019-07-16 NOTE — PROGRESS NOTES
Chief Complaint   Patient presents with   • Follow-Up      Fv for R ankle/ getting better/        HISTORY OF PRESENT ILLNESS: Patient is a 34 y.o. female who presents today for the following:    DOI: 5/16/2019, 4th visit  F/u visit right ankle and foot strain. 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  Patient that she is completely pain-free with ambulation and range of motion.  No prior injury or surgery.      There are no active problems to display for this patient.      Allergies:Patient has no known allergies.    Current Outpatient Prescriptions Ordered in [facility]   Medication Sig Dispense Refill   • ibuprofen (MOTRIN) 800 MG TABS Take 1 Tab by mouth every 8 hours as needed for Mild Pain. (Patient not taking: Reported on 7/16/2019) 30 Tab 0   • oxycodone-acetaminophen (PERCOCET) 5-325 MG TABS Take 1-2 Tabs by mouth every 6 hours as needed. (Patient not taking: Reported on 7/16/2019) 15 Tab 0   • cyclobenzaprine (FLEXERIL) 10 MG TABS Take 1 Tab by mouth 3 times a day as needed for Mild Pain. (Patient not taking: Reported on 7/16/2019) 15 Tab 0   • oxycodone-acetaminophen (PERCOCET) 5-325 MG TABS Take 1-2 Tabs by mouth every 6 hours as needed (moderate to severe pain). (Patient not taking: Reported on 7/16/2019) 10 Each 0   • cephALEXin (KEFLEX) 500 MG CAPS Take 1 Cap by mouth 4 times a day. (Patient not taking: Reported on 7/16/2019) 40 Cap 0   • oxycodone-acetaminophen (PERCOCET) 5-325 MG TABS Take 1-2 Tabs by mouth every four hours as needed. (Patient not taking: Reported on 7/16/2019) 15 Tab 0     No current Epic-ordered facility-administered medications on file.        No past medical history on file.    Social History   Substance Use Topics   • Smoking status: Current Every Day Smoker     Packs/day: 0.50     Years: 6.00     Types: Cigarettes   • Smokeless tobacco: Never Used   • Alcohol use No       No family status information on file.   No family history on file.    Review of Systems:    Constitutional  ROS: No unexpected change in weight, No weakness, No fatigue  Musculoskeletal/Extremities ROS: No peripheral edema, No pain, redness or swelling on the joints  Skin/Integumentary ROS: No edema, No evidence of rash, No itching      Exam:  /74   Pulse 80   Temp 36.9 °C (98.4 °F) (Temporal)   Resp 14   Wt 83 kg (183 lb)   SpO2 97%   General: Well developed, well nourished. No distress.  Pulmonary: Unlabored respiratory effort.   Neurologic: Grossly nonfocal. No facial asymmetry noted.  Extremities: Full range of motion right ankle.  Nontender to palpation.  No soft tissue swelling or ecchymosis is noted.  Skin: Warm, dry, good turgor. No rashes in visible areas.   Psych: Normal mood. Alert and oriented x3. Judgment and insight is normal.    Assessment/Plan:  Discharge/MMI.  1. Strain of right ankle, subsequent encounter

## 2019-07-16 NOTE — LETTER
[address] [place] [address]  Phone:  [phone] - Fax:  [phone]   [facility] Progress Report and Disability Certification  Date of Service: 7/16/2019   No Show:  No  Date / Time of Next Visit:     Claim Information   Patient Name: Hannah Winters  Claim Number:     Employer: [employer]  Date of Injury: 5/16/2019     Insurer / TPA: Eliud Leland  ID / SSN:     Occupation: [employer]  Diagnosis: The encounter diagnosis was Strain of right ankle, subsequent encounter.    Medical Information   Related to Industrial Injury? Yes    Subjective Complaints:  DOI: 5/16/2019, 4th visit  F/u visit right ankle and foot strain. 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  Patient that she is completely pain-free with ambulation and range of motion.  No prior injury or surgery.     Objective Findings: Extremities: Full range of motion right ankle.  Nontender to palpation.  No soft tissue swelling or ecchymosis is noted.   Pre-Existing Condition(s):     Assessment:   Condition Improved    Status: Discharged /  MMI  Permanent Disability:No    Plan:      Diagnostics:      Comments:       Disability Information   Status: Released to Full Duty    From:  7/16/2019  Through:   Restrictions are:     Physical Restrictions   Sitting:    Standing:    Stooping:    Bending:      Squatting:    Walking:    Climbing:    Pushing:      Pulling:    Other:    Reaching Above Shoulder (L):   Reaching Above Shoulder (R):       Reaching Below Shoulder (L):    Reaching Below Shoulder (R):      Not to exceed Weight Limits   Carrying(hrs):   Weight Limit(lb):   Lifting(hrs):   Weight  Limit(lb):     Comments:      Repetitive Actions   Hands: i.e. Fine Manipulations from Grasping:     Feet: i.e. Operating Foot Controls:     Driving / Operate Machinery:     Physician Name: Caren Valdez P.A.-C. Physician Signature:   e-Signature: Dr. Minor Martinez,  Medical Director   Clinic Name / Location: [facility]  [address] [place] [address]  [place] [address] Clinic Phone Number: Dept: [phone]   Appointment Time: 3:15 Pm Visit Start Time: 3:17 PM   Check-In Time:  3:14 Pm Visit Discharge Time:  3:43 PM   Original-Treating Physician or Chiropractor    Page 2-Insurer/TPA    Page 3-Employer    Page 4-Employee

## 2022-05-18 ENCOUNTER — NON-PROVIDER VISIT (OUTPATIENT)
Dept: URGENT CARE | Facility: PHYSICIAN GROUP | Age: 37
End: 2022-05-18

## 2022-05-18 DIAGNOSIS — Z02.1 PRE-EMPLOYMENT DRUG SCREENING: ICD-10-CM

## 2022-05-18 LAB
AMP AMPHETAMINE: NORMAL
COC COCAINE: NORMAL
INT CON NEG: NORMAL
INT CON POS: NORMAL
MET METHAMPHETAMINES: NORMAL
OPI OPIATES: NORMAL
PCP PHENCYCLIDINE: NORMAL
POC DRUG COMMENT 753798-OCCUPATIONAL HEALTH: NEGATIVE
THC: NORMAL

## 2022-05-18 PROCEDURE — 80305 DRUG TEST PRSMV DIR OPT OBS: CPT | Performed by: FAMILY MEDICINE

## 2022-07-28 ENCOUNTER — OFFICE VISIT (OUTPATIENT)
Dept: MEDICAL GROUP | Facility: CLINIC | Age: 37
End: 2022-07-28
Payer: MEDICAID

## 2022-07-28 VITALS
OXYGEN SATURATION: 97 % | HEIGHT: 62 IN | WEIGHT: 174 LBS | SYSTOLIC BLOOD PRESSURE: 126 MMHG | BODY MASS INDEX: 32.02 KG/M2 | HEART RATE: 76 BPM | RESPIRATION RATE: 17 BRPM | DIASTOLIC BLOOD PRESSURE: 84 MMHG

## 2022-07-28 DIAGNOSIS — R22.1 NECK SWELLING: ICD-10-CM

## 2022-07-28 DIAGNOSIS — Z97.5 NEXPLANON IN PLACE: ICD-10-CM

## 2022-07-28 DIAGNOSIS — F17.200 TOBACCO DEPENDENCE SYNDROME: ICD-10-CM

## 2022-07-28 DIAGNOSIS — E66.09 CLASS 1 OBESITY DUE TO EXCESS CALORIES WITHOUT SERIOUS COMORBIDITY WITH BODY MASS INDEX (BMI) OF 31.0 TO 31.9 IN ADULT: ICD-10-CM

## 2022-07-28 PROBLEM — E66.9 OBESITY: Status: ACTIVE | Noted: 2018-11-27

## 2022-07-28 LAB
HBA1C MFR BLD: 5.1 % (ref 0–5.6)
INT CON NEG: NORMAL
INT CON POS: NORMAL

## 2022-07-28 PROCEDURE — 83036 HEMOGLOBIN GLYCOSYLATED A1C: CPT | Performed by: STUDENT IN AN ORGANIZED HEALTH CARE EDUCATION/TRAINING PROGRAM

## 2022-07-28 PROCEDURE — 99213 OFFICE O/P EST LOW 20 MIN: CPT | Mod: GC | Performed by: STUDENT IN AN ORGANIZED HEALTH CARE EDUCATION/TRAINING PROGRAM

## 2022-07-28 ASSESSMENT — PATIENT HEALTH QUESTIONNAIRE - PHQ9: CLINICAL INTERPRETATION OF PHQ2 SCORE: 0

## 2022-07-28 NOTE — ASSESSMENT & PLAN NOTE
- Physical exam largely unremarkable, possible mild hypertrophy of SCM on the right side.  - Due to continued concerns, we will further evaluate with ultrasound of the soft tissues to rule out nodules or other masses.

## 2022-07-28 NOTE — PROGRESS NOTES
[facility]    Chief Complaint   Patient presents with   • Follow-Up     Swollen Gland right side/ would like thryoid testing done        HISTORY OF PRESENT ILLNESS: Patient is a 37 y.o. female established patient who presents today to discuss the medical issues below:    Problem   Nexplanon in Place    Nexplanon inserted in 2020     Neck Swelling    Patient has been experiencing subjective left-sided anterior neck swelling over the past few years.  She does not recall any trauma or other injury to the area, denies pain with motion, palpation, or swallowing.  Denies dysphagia.  Denies fevers, chills, night sweats, weight loss, or other concerning symptoms.  States that this that she has been evaluated by other physicians in the past, and was told that there is nothing of concern to be found.     Obesity    Current BMI 31.3.  Patient unhappy with her current weight and wishes to lose some.     Tobacco Dependence Syndrome    20-pack-year smoking history.  Patient currently smokes about 1/2 pack/day.  Not interested in quitting at this time.          Patient Active Problem List    Diagnosis Date Noted   • Nexplanon in place 07/28/2022   • Neck swelling 07/28/2022   • Obesity 11/27/2018   • Tobacco dependence syndrome 11/27/2018       Allergies:Patient has no known allergies.    Current Outpatient Medications   Medication Sig Dispense Refill   • Etonogestrel (NEXPLANON SC) Inject 1 Each under the skin.       No current facility-administered medications for this visit.         No past medical history on file.    Social History     Tobacco Use   • Smoking status: Current Every Day Smoker     Packs/day: 0.50     Years: 6.00     Pack years: 3.00     Types: Cigarettes   • Smokeless tobacco: Never Used   Substance Use Topics   • Alcohol use: No   • Drug use: No       No family status information on file.   No family history on file.    ROS:  Negative except as stated above.      Exam:   /84 (BP Location: Left  "arm, Patient Position: Sitting, BP Cuff Size: Adult)   Pulse 76   Resp 17   Ht 1.575 m (5' 2\")   Wt 78.9 kg (174 lb)   SpO2 97%  Body mass index is 31.83 kg/m².  General:  Well nourished, well developed female in NAD.  HENT: Normocephalic, bilateral TMs are intact, nasal and oral mucosa with no lesions,   Neck: Thyroid is not enlarged, no nodules palpated. No lymphadenopathy.  Mild hypertonicity of right SCM.  Normal range of motion.  Pulmonary: Clear to ausculation.  Normal effort. No rales, rhonchi, or wheezing.  Cardiovascular: Regular rate and rhythm without murmur.   Abdomen: Normal bowel sounds, soft and nontender, no palpable liver, spleen, or masses.  Extremities: No LE edema noted. 5/5 strength in all extremities  Neuro: Grossly nonfocal.  Skin: No lesions, erythema, or other abnormalities noted.  Psych: Alert and oriented to person, place, and time. Appropriate mood and conversation.    Assessment/Plan:    Neck swelling  - Physical exam largely unremarkable, possible mild hypertrophy of SCM on the right side.  - Due to continued concerns, we will further evaluate with ultrasound of the soft tissues to rule out nodules or other masses.    Tobacco dependence syndrome  - Discussed multiple modalities of smoking cessation.  Patient interested in quitting at this time.  - Continue to encourage patient to quit.    Obesity  - Encouraged healthy lifestyle changes including diet and increased exercise.  - Encouraged patient to return to the clinic to discuss more in-depth weight loss methods  -Hemoglobin A1c today 5.1%.          Charles Foster,   [facility]   PGY-3    "

## 2022-07-28 NOTE — ASSESSMENT & PLAN NOTE
- Discussed multiple modalities of smoking cessation.  Patient interested in quitting at this time.  - Continue to encourage patient to quit.

## 2022-08-24 ENCOUNTER — OCCUPATIONAL MEDICINE (OUTPATIENT)
Dept: URGENT CARE | Facility: CLINIC | Age: 37
End: 2022-08-24
Payer: COMMERCIAL

## 2022-08-24 VITALS
HEART RATE: 82 BPM | TEMPERATURE: 98.6 F | DIASTOLIC BLOOD PRESSURE: 80 MMHG | OXYGEN SATURATION: 98 % | RESPIRATION RATE: 20 BRPM | SYSTOLIC BLOOD PRESSURE: 122 MMHG | WEIGHT: 284 LBS | HEIGHT: 62 IN | BODY MASS INDEX: 52.26 KG/M2

## 2022-08-24 DIAGNOSIS — Z02.1 PRE-EMPLOYMENT DRUG SCREENING: ICD-10-CM

## 2022-08-24 DIAGNOSIS — M25.562 LEFT KNEE PAIN, UNSPECIFIED CHRONICITY: ICD-10-CM

## 2022-08-24 DIAGNOSIS — Z02.83 ENCOUNTER FOR DRUG SCREENING: ICD-10-CM

## 2022-08-24 LAB
AMP AMPHETAMINE: NORMAL
BREATH ALCOHOL COMMENT: NORMAL
COC COCAINE: NORMAL
INT CON NEG: NORMAL
INT CON POS: NORMAL
MET METHAMPHETAMINES: NORMAL
OPI OPIATES: NORMAL
PCP PHENCYCLIDINE: NORMAL
POC BREATHALIZER: 0 PERCENT (ref 0–0.01)
POC DRUG COMMENT 753798-OCCUPATIONAL HEALTH: NEGATIVE
THC: NORMAL

## 2022-08-24 PROCEDURE — 80305 DRUG TEST PRSMV DIR OPT OBS: CPT | Performed by: STUDENT IN AN ORGANIZED HEALTH CARE EDUCATION/TRAINING PROGRAM

## 2022-08-24 PROCEDURE — 82075 ASSAY OF BREATH ETHANOL: CPT | Performed by: STUDENT IN AN ORGANIZED HEALTH CARE EDUCATION/TRAINING PROGRAM

## 2022-08-24 PROCEDURE — 99213 OFFICE O/P EST LOW 20 MIN: CPT | Performed by: STUDENT IN AN ORGANIZED HEALTH CARE EDUCATION/TRAINING PROGRAM

## 2022-08-24 NOTE — LETTER
"EMPLOYEE’S CLAIM FOR COMPENSATION/ REPORT OF INITIAL TREATMENT  FORM C-4    EMPLOYEE’S CLAIM - PROVIDE ALL INFORMATION REQUESTED   First Name  Hannah Last Name  [facility] Birthdate                    1985                Sex  female Claim Number (Insurer’s Use Only)    Home Address  [address] Age  37 y.o. 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 (if applicable)  Coming down the stairs    How did this injury or occupational disease occur? (Be specific an answer in detail. Use additional sheet if necessary)  as I was coming down the staircase my left knee went numb and locked up, stooping to stow item in bin it hard for me to walk   If you believe that you have an occupational disease, when did you first have knowledge of the disability and it relationship to your employment?  N/A Witnesses to the Accident  N/A      Nature of Injury or Occupational Disease  Sprain  Part(s) of Body Injured or Affected  Knee (L), ,     I certify that the above is true and correct to the best of " my knowledge and that I have provided this information in order to obtain the benefits of [place]  I hereby authorize any physician, chiropractor, surgeon, practitioner, or other person, any hospital, including [facility], any medical service organization, any insurance company, or other institution or organization to release to each other, any medical or other information, including benefits paid or payable, pertinent to this injury or disease, except information relative to diagnosis, treatment and/or counseling for AIDS, psychological conditions, alcohol or controlled substances, for which I must give specific authorization.  A Photostat of this authorization shall be as valid as the original.     Date   Place Employee’s Original or  *Electronic Signature   THIS REPORT MUST BE COMPLETED AND MAILED WITHIN 3 WORKING DAYS OF TREATMENT   Place  [facility]  Name of [facility]   Date  8/24/2022 Diagnosis and Description of Injury or Occupational Disease  (Z02.83) Encounter for drug screening  (Z02.1) Pre-employment drug screening  (M25.562) Left knee pain, unspecified chronicity Is there evidence the injured employee was under the influence of alcohol and/or another controlled substance at the time of accident?  ? No ? Yes (if yes, please explain)    Hour  11:00 AM   Diagnoses of Encounter for drug screening, Pre-employment drug screening, and Left knee pain, unspecified chronicity were pertinent to this visit.     Treatment  Work restrictions include no climbing up and down stairs.  Supportive care measures including rest, ice, elevation and OTC NSAIDs reviewed with patient in clinic.  Patient to follow-up in 5 days.  Have you advised the patient to remain off work five days or     more?    X-Ray Findings      ? Yes Indicate dates:   From   To      From  "information given by the employee, together with medical evidence, can        you directly connect this injury or occupational disease as job incurred?    ? No If no, is the injured employee capable of:  ? full duty  No ? modified duty  Yes   Is additional medical care by a physician indicated?  Yes If Modified Duty, Specify any Limitations / Restrictions  Work restrictions include no climbing up and down stairs.    Do you know of any previous injury or disease contributing to this condition or occupational disease?  ? Yes ? No (Explain if yes)                          No   Date  8/24/2022 Print Health Care Provider's   Sapphire Merino P.A.-C. I certify the employer’s copy of  this form was mailed on:   Address  [address] Insurer’s Use Only     [place] Zip  [address]    Provider’s Tax ID Number  [id number] Telephone  Dept: [phone]             Health Care Provider’s Original or Electronic Signature  e-SAPPHIRE Hodges P.A.-C. Degree (MD,DO, DC,PANakiaC,APRN)   PAAUSTEN      * Complete and attach Release of Information (Form C-4A) when injured employee signs C-4 Form electronically  ORIGINAL - TREATING HEALTHCARE PROVIDER PAGE 2 - INSURER/TPA PAGE 3 - EMPLOYER PAGE 4 - EMPLOYEE             Form C-4 (rev.08/21)           BRIEF DESCRIPTION OF RIGHTS AND BENEFITS  (Pursuant to [place] 616[place].050)    Notice of Injury or Occupational Disease (Incident Report Form C-1): If an injury or occupational disease (OD) arises out of and in the course of employment, you must provide written notice to your employer as soon as practicable, but no later than 7 days after the accident or OD. Your employer shall maintain a sufficient supply of the required forms.    Claim for Compensation (Form C-4): If medical treatment is sought, the form C-4 is available at the place of initial treatment. A completed \"Claim for Compensation\" (Form C-4) must be filed within 90 days after an accident or OD. The treating physician or " chiropractor must, within 3 working days after treatment, complete and mail to the employer, the employer's insurer and third-party , the Claim for Compensation.    Medical Treatment: If you require medical treatment for your on-the-job injury or OD, you may be required to select a physician or chiropractor from a list provided by your workers’ compensation insurer, if it has contracted with an Organization for Managed Care (MCO) or Preferred Provider Organization (PPO) or providers of health care. If your employer has not entered into a contract with an MCO or PPO, you may select a physician or chiropractor from the Panel of Physicians and Chiropractors. Any medical costs related to your industrial injury or OD will be paid by your insurer.    Temporary Total Disability (TTD): If your doctor has certified that you are unable to work for a period of at least 5 consecutive days, or 5 cumulative days in a 20-day period, or places restrictions on you that your employer does not accommodate, you may be entitled to TTD compensation.    Temporary Partial Disability (TPD): If the wage you receive upon reemployment is less than the compensation for TTD to which you are entitled, the insurer may be required to pay you TPD compensation to make up the difference. TPD can only be paid for a maximum of 24 months.    Permanent Partial Disability (PPD): When your medical condition is stable and there is an indication of a PPD as a result of your injury or OD, within 30 days, your insurer must arrange for an evaluation by a rating physician or chiropractor to determine the degree of your PPD. The amount of your PPD award depends on the date of injury, the results of the PPD evaluation, your age and wage.    Permanent Total Disability (PTD): If you are medically certified by a treating physician or chiropractor as permanently and totally disabled and have been granted a PTD status by your insurer, you are entitled to  receive monthly benefits not to exceed 66 2/3% of your average monthly wage. The amount of your PTD payments is subject to reduction if you previously received a lump-sum PPD award.    Vocational Rehabilitation Services: You may be eligible for vocational rehabilitation services if you are unable to return to the job due to a permanent physical impairment or permanent restrictions as a result of your injury or occupational disease.    Transportation and Per Ghassan Reimbursement: You may be eligible for travel expenses and per ghassan associated with medical treatment.    Reopening: You may be able to reopen your claim if your condition worsens after claim closure.     Appeal Process: If you disagree with a written determination issued by the insurer or the insurer does not respond to your request, you may appeal to the Department of Administration, , by following the instructions contained in your determination letter. You must appeal the determination within 70 days from the date of the determination letter at [address], [address], [place], [place] [address], or [address], [place] [address]. If you disagree with the  decision, you may appeal to the Department of Administration, . You must file your appeal within 30 days from the date of the  decision letter at [address], [address], [place], [place] [address], or [address], [address], [place] [address]. If you disagree with a decision of an , you may file a petition for judicial review with the District Court. You must do so within 30 days of the Appeal Officer’s decision. You may be represented by an  at your own expense or you may contact the [facility] for possible representation.    [place]  for Injured Workers (NAIW): If you disagree with a  decision, you may request that NAIW represent you without  charge at an  Hearing. For information regarding denial of benefits, you may contact the [facility] at: [address], [address], [place] [address], [phone], or [address], [address], [place] [address], [phone]    To File a Complaint with the Division: If you wish to file a complaint with the  of the Division of Industrial Relations (DIR),  please contact the Workers’ Compensation Section, [address], [address], [place], [place] [address], telephone (610) [phone], or [address], [address], [place], [place] [address], telephone [phone]) [phone].    For assistance with Workers’ Compensation Issues: You may contact the [facility] Office for Consumer Health Assistance, [address], [address] [facility] [address], [place], [place] [address], Toll Free 1-355.886.4604, Web site: http://[facility].[place].gov/Programs/DALE E-mail: dale@[facility].nv.[facility]              __________________________________________________________________                                    _________________            Employee Name / Signature                                                                                                                            Date                                                                                                                                                                                                                              D-2 (rev. 10/20)

## 2022-08-24 NOTE — LETTER
"EMPLOYEE’S CLAIM FOR COMPENSATION/ REPORT OF INITIAL TREATMENT  FORM C-4    EMPLOYEE’S CLAIM - PROVIDE ALL INFORMATION REQUESTED   First Name  Hannah Last Name  [facility] Birthdate                    1985                Sex  female Claim Number (Insurer’s Use Only)    Home Address  [address] Age  37 y.o. 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    Employer's Name/Company Name  [employer]  Telephone  [phone]    Office Mail Address (Number and Street)   [address]  Zip  [address]    Date of Injury  8/17/2022               Hours Injury  8:00 AM Date Employer Notified  8/24/2022 Last Day of Work after Injury     or Occupational Disease  8/24/2022 Supervisor to Whom Injury     Reported  [facility]   Address or Location of Accident (if applicable)  Work [1]   What were you doing at the time of accident? (if applicable)  Coming down the stairs    How did this injury or occupational disease occur? (Be specific an answer in detail. Use additional sheet if necessary)  as I was coming down the staircase my left knee went numb and locked up, stooping to stow item in bin it hard for me to walk   If you believe that you have an occupational disease, when did you first have knowledge of the disability and it relationship to your employment?  N/A Witnesses to the Accident  N/A      Nature of Injury or Occupational Disease  Sprain  Part(s) of Body Injured or Affected  Knee (L), ,     I certify that the above is true and correct to the best of " my knowledge and that I have provided this information in order to obtain the benefits of [place]  I hereby authorize any physician, chiropractor, surgeon, practitioner, or other person, any hospital, including [facility] or [facility], any medical service organization, any insurance company, or other institution or organization to release to each other, any medical or other information, including benefits paid or payable, pertinent to this injury or disease, except information relative to diagnosis, treatment and/or counseling for AIDS, psychological conditions, alcohol or controlled substances, for which I must give specific authorization.  A Photostat of this authorization shall be as valid as the original.     Date   Place Employee’s Original or  *Electronic Signature   THIS REPORT MUST BE COMPLETED AND MAILED WITHIN 3 WORKING DAYS OF TREATMENT   Place  [facility]  Name of [facility]   Date  8/24/2022 Diagnosis and Description of Injury or Occupational Disease  (Z02.83) Encounter for drug screening  (Z02.1) Pre-employment drug screening  (M25.562) Left knee pain, unspecified chronicity Is there evidence the injured employee was under the influence of alcohol and/or another controlled substance at the time of accident?  ? No ? Yes (if yes, please explain)    Hour  11:00 AM   Diagnoses of Encounter for drug screening, Pre-employment drug screening, and Left knee pain, unspecified chronicity were pertinent to this visit.     Treatment  Work restrictions include no climbing up and down stairs.  Supportive care measures including rest, ice, elevation and OTC NSAIDs reviewed with patient in clinic.  Patient to follow-up in 5 days.  Have you advised the patient to remain off work five days or     more?    X-Ray Findings      ? Yes Indicate dates:   From   To      From  "information given by the employee, together with medical evidence, can        you directly connect this injury or occupational disease as job incurred?    ? No If no, is the injured employee capable of:  ? full duty  No ? modified duty  Yes   Is additional medical care by a physician indicated?  Yes If Modified Duty, Specify any Limitations / Restrictions  Work restrictions include no climbing up and down stairs.    Do you know of any previous injury or disease contributing to this condition or occupational disease?  ? Yes ? No (Explain if yes)                          No   Date  8/24/2022 Print Health Care Provider's   Sapphire Merino P.A.-C. I certify the employer’s copy of  this form was mailed on:   Address  [address] Insurer’s Use Only     [place] Zip  [address]    Provider’s Tax ID Number  [id number] Telephone  Dept: [phone]             Health Care Provider’s Original or Electronic Signature  e-SAPPHIRE Hodges P.A.-C. Degree (MD,DO, DC,PANakiaC,APRN)   PAAUSTEN      * Complete and attach Release of Information (Form C-4A) when injured employee signs C-4 Form electronically  ORIGINAL - TREATING HEALTHCARE PROVIDER PAGE 2 - INSURER/TPA PAGE 3 - EMPLOYER PAGE 4 - EMPLOYEE             Form C-4 (rev.08/21)           BRIEF DESCRIPTION OF RIGHTS AND BENEFITS  (Pursuant to [place] 616C.050)    Notice of Injury or Occupational Disease (Incident Report Form C-1): If an injury or occupational disease (OD) arises out of and in the course of employment, you must provide written notice to your employer as soon as practicable, but no later than 7 days after the accident or OD. Your employer shall maintain a sufficient supply of the required forms.    Claim for Compensation (Form C-4): If medical treatment is sought, the form C-4 is available at the place of initial treatment. A completed \"Claim for Compensation\" (Form C-4) must be filed within 90 days after an accident or OD. The treating physician or " chiropractor must, within 3 working days after treatment, complete and mail to the employer, the employer's insurer and third-party , the Claim for Compensation.    Medical Treatment: If you require medical treatment for your on-the-job injury or OD, you may be required to select a physician or chiropractor from a list provided by your workers’ compensation insurer, if it has contracted with an Organization for Managed Care (MCO) or Preferred Provider Organization (PPO) or providers of health care. If your employer has not entered into a contract with an MCO or PPO, you may select a physician or chiropractor from the Panel of Physicians and Chiropractors. Any medical costs related to your industrial injury or OD will be paid by your insurer.    Temporary Total Disability (TTD): If your doctor has certified that you are unable to work for a period of at least 5 consecutive days, or 5 cumulative days in a 20-day period, or places restrictions on you that your employer does not accommodate, you may be entitled to TTD compensation.    Temporary Partial Disability (TPD): If the wage you receive upon reemployment is less than the compensation for TTD to which you are entitled, the insurer may be required to pay you TPD compensation to make up the difference. TPD can only be paid for a maximum of 24 months.    Permanent Partial Disability (PPD): When your medical condition is stable and there is an indication of a PPD as a result of your injury or OD, within 30 days, your insurer must arrange for an evaluation by a rating physician or chiropractor to determine the degree of your PPD. The amount of your PPD award depends on the date of injury, the results of the PPD evaluation, your age and wage.    Permanent Total Disability (PTD): If you are medically certified by a treating physician or chiropractor as permanently and totally disabled and have been granted a PTD status by your insurer, you are entitled to  receive monthly benefits not to exceed 66 2/3% of your average monthly wage. The amount of your PTD payments is subject to reduction if you previously received a lump-sum PPD award.    Vocational Rehabilitation Services: You may be eligible for vocational rehabilitation services if you are unable to return to the job due to a permanent physical impairment or permanent restrictions as a result of your injury or occupational disease.    Transportation and Per Ghassan Reimbursement: You may be eligible for travel expenses and per ghassan associated with medical treatment.    Reopening: You may be able to reopen your claim if your condition worsens after claim closure.     Appeal Process: If you disagree with a written determination issued by the insurer or the insurer does not respond to your request, you may appeal to the Department of Administration, , by following the instructions contained in your determination letter. You must appeal the determination within 70 days from the date of the determination letter at [address], [address], [place], [place] [address], or [address], [place] [address]. If you disagree with the  decision, you may appeal to the Department of Administration, . You must file your appeal within 30 days from the date of the  decision letter at [address], [address], [place], [place] [address], or [address], [place] [address]. If you disagree with a decision of an , you may file a petition for judicial review with the District Court. You must do so within 30 days of the Appeal Officer’s decision. You may be represented by an  at your own expense or you may contact the [facility] for possible representation.    [place]  for Injured Workers (NAIW): If you disagree with a  decision, you may request that NAIW represent you without  charge at an  Hearing. For information regarding denial of benefits, you may contact the [facility] at: [address], [address], [place] [address], [phone], or [address], [address], [place] [address], [phone]    To File a Complaint with the Division: If you wish to file a complaint with the  of the Division of Industrial Relations (DIR),  please contact the Workers’ Compensation Section, [address], [address], [place], [place] [address], telephone [phone]) [phone], or [address], [address], [place], [place] [address], telephone [phone].    For assistance with Workers’ Compensation Issues: You may contact the [facility] Office for Consumer Health Assistance, [address], [address], [place], [place] [address], Toll Free 1-108.278.4391, Web site: http://[facility].[place].gov/Programs/DALE E-mail: [email or web address]              __________________________________________________________________                                    _________________            Employee Name / Signature                                                                                                                            Date                                                                                                                                                                                                                              D-2 (rev. 10/20)

## 2022-08-24 NOTE — LETTER
[address] [place] [address]  Phone:  [phone] - Fax:  [phone]   [facility] Progress Report and Disability Certification  Date of Service: 8/24/2022   No Show:  No  Date / Time of Next Visit: 8/29/2022 @ 11:00 AM    Claim Information   Patient Name: Hannah Winters  Claim Number:     Employer: [employer]  Date of Injury: 8/17/2022     Insurer / TPA: Eliud Beaumont  ID / SSN:     Occupation: Team Member [employer]  Diagnosis: Diagnoses of Encounter for drug screening, Pre-employment drug screening, and Left knee pain, unspecified chronicity were pertinent to this visit.    Medical Information   Related to Industrial Injury?   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  Patient is a 37-year-old female who presents today for work comp claim regarding left knee pain.  Patient started noticing symptoms approximately a week ago.  She denies any injury or trauma to the left knee and symptoms were gradual in onset.  She states pain is worse while walking up and down stairs.  She has noticed clicking/popping while walking up and down stairs.  Her knee has not given out.  Patient has noticed some swelling and bruising located on the medial aspect of her left knee.  Patient has taken Tylenol and ibuprofen with minimal relief of symptoms.  Patient has not tried ice or elevation.   Objective Findings: Left knee: Generalized effusion present.  Overlying skin intact without erythema or ecchymosis.  Normal range of motion with flexion/extension. No tenderness/bony tenderness. No medial joint line or lateral joint line tenderness. Normal alignment. Normal pulse.   Instability Tests: Anterior drawer test negative. Medial Mandy test negative and lateral Mandy test negative.   Bilateral lower  extremities with equal range of motion and equal strength.  Neurovascularly intact.     Pre-Existing Condition(s):     Assessment:   Initial Visit    Status: Additional Care Required  Permanent Disability:No    Plan: Medication    Diagnostics:      Comments:       Disability Information   Status: Released to Restricted Duty    From:     Through: 2022 Restrictions are: Temporary   Physical Restrictions   Sitting:    Standing:    Stooping:    Bending:      Squatting:    Walking:    Climbin hrs/day  Comments:No climbing up/down stairs Pushing:      Pulling:    Other:    Reaching Above Shoulder (L):   Reaching Above Shoulder (R):       Reaching Below Shoulder (L):    Reaching Below Shoulder (R):      Not to exceed Weight Limits   Carrying(hrs):   Weight Limit(lb):   Lifting(hrs):   Weight  Limit(lb):     Comments: Work restrictions include no climbing up and down stairs.  Supportive care measures including rest, ice, elevation and OTC NSAIDs reviewed with patient in clinic.  Patient to follow-up in 5 days for second work comp evaluation.    Repetitive Actions   Hands: i.e. Fine Manipulations from Grasping:     Feet: i.e. Operating Foot Controls:     Driving / Operate Machinery:     Health Care Provider’s Original or Electronic Signature  Sapphire Merino P.A.-C. Health Care Provider’s Original or Electronic Signature    Manuel Guzmán MD         Clinic Name / Location: [address] [place] [address], [place] [address] Clinic Phone Number: Dept: [phone]   Appointment Time: 10:00 Am Visit Start Time: 11:00 AM   Check-In Time:  10:23 Am Visit Discharge Time:  12:10 PM    Original-Treating Physician or Chiropractor    Page 2-Insurer/TPA    Page 3-Employer    Page 4-Employee

## 2022-08-24 NOTE — PROGRESS NOTES
Subjective     Hannah Winters is a 37 y.o. 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  Patient is a 37-year-old female who presents today for work comp claim regarding left knee pain.  Patient started noticing symptoms approximately a week ago.  She denies any injury or trauma to the left knee and symptoms were gradual in onset.  She states pain is worse while walking up and down stairs.  She has noticed clicking/popping while walking up and down stairs.  Her knee has not given out.  Patient has noticed some swelling and bruising located on the medial aspect of her left knee.  Patient has taken Tylenol and ibuprofen with minimal relief of symptoms.  Patient has not tried ice or elevation.     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  Patient is a 37-year-old female who presents today for work comp claim regarding left knee pain.  Patient started noticing symptoms approximately a week ago.  She denies any injury or trauma to the left knee and symptoms were gradual in onset.  She states pain is worse while walking up and down stairs.  She has noticed clicking/popping while walking up and down stairs.  Her knee has not given out.  Patient has noticed some swelling and bruising located on the medial aspect of her left knee.  Patient has taken Tylenol and ibuprofen with minimal relief of symptoms.  Patient has not tried ice or elevation.  Patient denies any previous knee injury.  Patient does not have other job.    Knee Pain  This is a new problem. The current episode started 1 to 4 weeks ago. The problem occurs daily.     Review of Systems   Musculoskeletal:  Positive for joint pain (Left knee  "pain).   All other systems reviewed and are negative.           Objective     /80 (BP Location: Left arm, Patient Position: Sitting, BP Cuff Size: Adult long)   Pulse 82   Temp 37 °C (98.6 °F) (Temporal)   Resp 20   Ht 1.575 m (5' 2\")   Wt (!) 129 kg (284 lb)   SpO2 98%   BMI 51.94 kg/m²      Physical Exam  Vitals reviewed.   Constitutional:       Appearance: Normal appearance.   Cardiovascular:      Rate and Rhythm: Normal rate and regular rhythm.   Pulmonary:      Effort: Pulmonary effort is normal.      Breath sounds: Normal breath sounds.   Musculoskeletal:         General: Normal range of motion.      Right upper leg: Normal.      Left upper leg: Normal.      Right knee: Normal.      Left knee: Effusion present. No erythema, ecchymosis or bony tenderness. Normal range of motion. No tenderness. No medial joint line or lateral joint line tenderness. Normal alignment. Normal pulse.      Instability Tests: Anterior drawer test negative. Medial Mandy test negative and lateral Mandy test negative.      Right ankle: Normal.      Left ankle: Normal.   Skin:     General: Skin is warm and dry.      Capillary Refill: Capillary refill takes less than 2 seconds.   Neurological:      General: No focal deficit present.      Mental Status: She is alert. Mental status is at baseline.       Left knee: Generalized effusion present.  Overlying skin intact without erythema or ecchymosis.  Normal range of motion with flexion/extension. No tenderness/bony tenderness. No medial joint line or lateral joint line tenderness. Normal alignment. Normal pulse.   Instability Tests: Anterior drawer test negative. Medial Mandy test negative and lateral Mandy test negative.   Bilateral lower extremities with equal range of motion and equal strength.  Neurovascularly intact.                   Assessment & Plan        1. Left knee pain, unspecified chronicity    2. Encounter for drug screening  - POCT 6 Panel Urine Drug " Screen  - POCT Breath Alcohol Test    3. Pre-employment drug screening    C4 and D39 completed in clinic and patient provided with printouts from partner.  Due to no clear SOLANGE, injury/trauma to left knee, imaging not indicated at this time.  Physical exam revealed diffuse effusion of left knee without erythema or ecchymosis.  Range of motion and strength of left/right knees equal bilaterally.  Left knee revealed no joint line tenderness and anterior drawer test, medial Mandy and lateral Mandy all were negative.    Work restrictions include no climbing up and down stairs. Supportive care measures including rest, ice, elevation and OTC NSAIDs reviewed with patient in clinic.  Patient to follow-up in 5 days for second work comp evaluation.    Differential diagnoses, supportive care, and indications for immediate follow-up discussed with patient. Pathogenesis of diagnosis discussed including typical length and natural progression.      Instructed to return to urgent care or nearest emergency department if symptoms fail to improve, for any change in condition, further concerns, or new concerning symptoms.    Patient states understanding and agrees with the plan of care and discharge instructions.

## 2022-08-29 ENCOUNTER — HOSPITAL ENCOUNTER (OUTPATIENT)
Dept: RADIOLOGY | Facility: MEDICAL CENTER | Age: 37
End: 2022-08-29
Attending: STUDENT IN AN ORGANIZED HEALTH CARE EDUCATION/TRAINING PROGRAM
Payer: COMMERCIAL

## 2022-08-29 DIAGNOSIS — R22.1 NECK SWELLING: ICD-10-CM

## 2022-08-29 PROCEDURE — 76536 US EXAM OF HEAD AND NECK: CPT

## 2022-09-08 ENCOUNTER — NON-PROVIDER VISIT (OUTPATIENT)
Dept: OCCUPATIONAL MEDICINE | Facility: CLINIC | Age: 37
End: 2022-09-08
Payer: COMMERCIAL

## 2022-09-08 DIAGNOSIS — Z02.83 ENCOUNTER FOR DRUG SCREENING: ICD-10-CM

## 2022-09-08 DIAGNOSIS — Z02.1 PRE-EMPLOYMENT DRUG SCREENING: ICD-10-CM

## 2022-09-08 PROCEDURE — 80305 DRUG TEST PRSMV DIR OPT OBS: CPT | Performed by: NURSE PRACTITIONER

## 2022-09-19 ENCOUNTER — APPOINTMENT (OUTPATIENT)
Dept: MEDICAL GROUP | Facility: CLINIC | Age: 37
End: 2022-09-19
Payer: COMMERCIAL

## 2022-09-22 ENCOUNTER — OFFICE VISIT (OUTPATIENT)
Dept: MEDICAL GROUP | Facility: CLINIC | Age: 37
End: 2022-09-22
Payer: COMMERCIAL

## 2022-09-22 ENCOUNTER — TELEPHONE (OUTPATIENT)
Dept: MEDICAL GROUP | Facility: CLINIC | Age: 37
End: 2022-09-22

## 2022-09-22 VITALS — OXYGEN SATURATION: 95 % | HEART RATE: 76 BPM | DIASTOLIC BLOOD PRESSURE: 72 MMHG | SYSTOLIC BLOOD PRESSURE: 113 MMHG

## 2022-09-22 DIAGNOSIS — M25.561 ACUTE PAIN OF BOTH KNEES: ICD-10-CM

## 2022-09-22 DIAGNOSIS — M25.562 ACUTE PAIN OF BOTH KNEES: ICD-10-CM

## 2022-09-22 PROCEDURE — 99213 OFFICE O/P EST LOW 20 MIN: CPT | Mod: GE | Performed by: HEALTH CARE PROVIDER

## 2022-09-24 NOTE — ASSESSMENT & PLAN NOTE
Pt presents with L then subsequent R knee pain following atraumatic fall at work. No history of inability to walk, swelling or severe pain of joints. L knee exam notable only for point tenderness of medial tibial plateau. XR ordered to eval for insidious fracture or contusion. R knee exam notable for tenderness of quadriceps consistent with tendonitis, likely secondary to altered gait following initial injury. HEP provided. Recommend NSAID use as needed as well as ice, heat, and elevation. Will contact pt with XR results when available.

## 2022-09-24 NOTE — PROGRESS NOTES
Subjective:     CC: Knee pain    HPI:   Hannah is a 37 y.o. female with no significant past medical history presents today for evaluation of bilateral knee pain. She noted pain in L anterior knee when she slipped and fell awkwardly down stairs at work 1.5 mo ago. She did not have trauma to knee. She was able to walk following incident and denies history of swelling. She denies feeling pop or click at time of injury. After 3 weeks of L knee pain and possibly mild limp due to pain, she began noticing pain in R knee as well.    Problem   Acute Pain of Both Knees       Current Outpatient Medications Ordered in Epic   Medication Sig Dispense Refill    Etonogestrel (NEXPLANON SC) Inject 1 Each under the skin.       No current Epic-ordered facility-administered medications on file.       Health Maintenance: Completed    ROS:  Gen: no fevers/chills, no changes in weight  Eyes: no changes in vision  ENT: no sore throat, no hearing loss, no bloody nose  Pulm: no sob, no cough  CV: no chest pain, no palpitations  GI: no nausea/vomiting, no diarrhea  : no dysuria  MSk: no myalgias  Skin: no rash  Neuro: no headaches, no numbness/tingling  Heme/Lymph: no easy bruising      Objective:     Exam:  /72 (BP Location: Left arm, Patient Position: Sitting, BP Cuff Size: Adult)   Pulse 76   SpO2 95%  There is no height or weight on file to calculate BMI.    Constitutional: Alert, no distress, well-groomed.  Skin: Warm, dry, good turgor, no rashes in visible areas.  Eye: Equal, round and reactive, conjunctiva clear, lids normal.  ENMT: Lips without lesions, good dentition, moist mucous membranes.  Neck: Trachea midline, no masses, no thyromegaly.  Respiratory: Unlabored respiratory effort, no cough.  MSK: Normal gait, moves all extremities.  Neuro: Grossly non-focal.   Psych: Alert and oriented x3, normal affect and mood.    L Knee:  No bony deformities, inflammation, or tenderness in soft tissue.  No baker's cyst. Full ROM  (extension/flexion). Limited internal and external rotation.  Medial, lateral meniscus; anterior, posterior cruciate ligaments ,medial & lateral collateral ligaments intact as assessed with negative Anterior/Posterior Drawer signs, Lachman's, and Mandy's Tests. No effusion. Mild tenderness of medial tibial plateau.    R Knee:  No bony deformities, inflammation, or tenderness in bony prominences. Mild tenderness of quadriceps tendon. No baker's cyst. Full ROM (extension/flexion). Limited internal and external rotation.  Medial, lateral meniscus; anterior, posterior cruciate ligaments ,medial & lateral collateral ligaments intact as assessed with negative Anterior/Posterior Drawer signs, Lachman's, and Mandy's Tests. No effusion.          Labs: None    Assessment & Plan:     37 y.o. female with the following -     Problem List Items Addressed This Visit       Acute pain of both knees     Pt presents with L then subsequent R knee pain following atraumatic fall at work. No history of inability to walk, swelling or severe pain of joints. L knee exam notable only for point tenderness of medial tibial plateau. XR ordered to eval for insidious fracture or contusion. R knee exam notable for tenderness of quadriceps consistent with tendonitis, likely secondary to altered gait following initial injury. HEP provided. Recommend NSAID use as needed as well as ice, heat, and elevation. Will contact pt with XR results when available.         Relevant Orders    DX-KNEE COMPLETE 4+ LEFT    DX-KNEE COMPLETE 4+ RIGHT    Referral to Genetic Research Studies    CBC WITH DIFFERENTIAL    Comp Metabolic Panel                 Jaspal Sher M.D.  [facility]  PGY-2

## 2022-09-29 ENCOUNTER — RESEARCH ENCOUNTER (OUTPATIENT)
Dept: RESEARCH | Facility: WORKSITE | Age: 37
End: 2022-09-29
Payer: COMMERCIAL

## 2022-09-29 DIAGNOSIS — Z00.6 RESEARCH STUDY PATIENT: ICD-10-CM

## 2022-09-29 NOTE — RESEARCH NOTE
Patient is already enrolled in \[facility] [place] [facility]\"". Patient would like to consent and enroll in the SEGURA study. Patient has been scheduled for a ELF test.

## 2022-09-30 ENCOUNTER — HOSPITAL ENCOUNTER (OUTPATIENT)
Facility: MEDICAL CENTER | Age: 37
End: 2022-09-30
Attending: PATHOLOGY
Payer: COMMERCIAL

## 2022-09-30 DIAGNOSIS — Z00.6 RESEARCH STUDY PATIENT: ICD-10-CM

## 2022-10-05 LAB
ELF SCORE: 8.5
RELATIVE RISK: NORMAL
RISK GROUP: NORMAL
RISK: 3.3 %

## 2022-10-07 ENCOUNTER — HOSPITAL ENCOUNTER (OUTPATIENT)
Dept: RADIOLOGY | Facility: MEDICAL CENTER | Age: 37
End: 2022-10-07
Attending: HEALTH CARE PROVIDER
Payer: MEDICAID

## 2022-10-07 DIAGNOSIS — M25.562 ACUTE PAIN OF BOTH KNEES: ICD-10-CM

## 2022-10-07 DIAGNOSIS — M25.561 ACUTE PAIN OF BOTH KNEES: ICD-10-CM

## 2022-10-07 PROCEDURE — 73564 X-RAY EXAM KNEE 4 OR MORE: CPT | Mod: LT

## 2022-10-07 PROCEDURE — 73564 X-RAY EXAM KNEE 4 OR MORE: CPT | Mod: RT

## 2022-11-18 SDOH — ECONOMIC STABILITY: FOOD INSECURITY: WITHIN THE PAST 12 MONTHS, YOU WORRIED THAT YOUR FOOD WOULD RUN OUT BEFORE YOU GOT MONEY TO BUY MORE.: NEVER TRUE

## 2022-11-18 SDOH — ECONOMIC STABILITY: HOUSING INSECURITY
IN THE LAST 12 MONTHS, WAS THERE A TIME WHEN YOU DID NOT HAVE A STEADY PLACE TO SLEEP OR SLEPT IN A SHELTER (INCLUDING NOW)?: NO

## 2022-11-18 SDOH — ECONOMIC STABILITY: FOOD INSECURITY: WITHIN THE PAST 12 MONTHS, THE FOOD YOU BOUGHT JUST DIDN'T LAST AND YOU DIDN'T HAVE MONEY TO GET MORE.: NEVER TRUE

## 2022-11-18 SDOH — ECONOMIC STABILITY: INCOME INSECURITY: IN THE LAST 12 MONTHS, WAS THERE A TIME WHEN YOU WERE NOT ABLE TO PAY THE MORTGAGE OR RENT ON TIME?: NO

## 2022-11-18 SDOH — ECONOMIC STABILITY: HOUSING INSECURITY: IN THE LAST 12 MONTHS, HOW MANY PLACES HAVE YOU LIVED?: 1

## 2022-11-18 SDOH — ECONOMIC STABILITY: TRANSPORTATION INSECURITY
IN THE PAST 12 MONTHS, HAS LACK OF TRANSPORTATION KEPT YOU FROM MEETINGS, WORK, OR FROM GETTING THINGS NEEDED FOR DAILY LIVING?: NO

## 2022-11-18 SDOH — ECONOMIC STABILITY: INCOME INSECURITY: HOW HARD IS IT FOR YOU TO PAY FOR THE VERY BASICS LIKE FOOD, HOUSING, MEDICAL CARE, AND HEATING?: NOT HARD AT ALL

## 2022-11-18 SDOH — HEALTH STABILITY: PHYSICAL HEALTH: ON AVERAGE, HOW MANY DAYS PER WEEK DO YOU ENGAGE IN MODERATE TO STRENUOUS EXERCISE (LIKE A BRISK WALK)?: 3 DAYS

## 2022-11-18 SDOH — HEALTH STABILITY: PHYSICAL HEALTH: ON AVERAGE, HOW MANY MINUTES DO YOU ENGAGE IN EXERCISE AT THIS LEVEL?: 50 MIN

## 2022-11-18 SDOH — ECONOMIC STABILITY: TRANSPORTATION INSECURITY
IN THE PAST 12 MONTHS, HAS THE LACK OF TRANSPORTATION KEPT YOU FROM MEDICAL APPOINTMENTS OR FROM GETTING MEDICATIONS?: NO

## 2022-11-18 ASSESSMENT — SOCIAL DETERMINANTS OF HEALTH (SDOH)
HOW OFTEN DO YOU GET TOGETHER WITH FRIENDS OR RELATIVES?: PATIENT DECLINED
DO YOU BELONG TO ANY CLUBS OR ORGANIZATIONS SUCH AS CHURCH GROUPS UNIONS, FRATERNAL OR ATHLETIC GROUPS, OR SCHOOL GROUPS?: NO
IN A TYPICAL WEEK, HOW MANY TIMES DO YOU TALK ON THE PHONE WITH FAMILY, FRIENDS, OR NEIGHBORS?: MORE THAN THREE TIMES A WEEK
ARE YOU MARRIED, WIDOWED, DIVORCED, SEPARATED, NEVER MARRIED, OR LIVING WITH A PARTNER?: NEVER MARRIED
HOW OFTEN DO YOU ATTEND CHURCH OR RELIGIOUS SERVICES?: NEVER
HOW OFTEN DO YOU ATTENT MEETINGS OF THE CLUB OR ORGANIZATION YOU BELONG TO?: NEVER

## 2022-11-18 ASSESSMENT — LIFESTYLE VARIABLES
AUDIT-C TOTAL SCORE: 1
SKIP TO QUESTIONS 9-10: 1
HOW OFTEN DO YOU HAVE A DRINK CONTAINING ALCOHOL: MONTHLY OR LESS
HOW MANY STANDARD DRINKS CONTAINING ALCOHOL DO YOU HAVE ON A TYPICAL DAY: 1 OR 2
HOW OFTEN DO YOU HAVE SIX OR MORE DRINKS ON ONE OCCASION: NEVER

## 2022-11-25 SDOH — ECONOMIC STABILITY: INCOME INSECURITY: HOW HARD IS IT FOR YOU TO PAY FOR THE VERY BASICS LIKE FOOD, HOUSING, MEDICAL CARE, AND HEATING?: NOT HARD AT ALL

## 2022-11-25 SDOH — HEALTH STABILITY: PHYSICAL HEALTH: ON AVERAGE, HOW MANY DAYS PER WEEK DO YOU ENGAGE IN MODERATE TO STRENUOUS EXERCISE (LIKE A BRISK WALK)?: 3 DAYS

## 2022-11-25 SDOH — ECONOMIC STABILITY: FOOD INSECURITY: WITHIN THE PAST 12 MONTHS, YOU WORRIED THAT YOUR FOOD WOULD RUN OUT BEFORE YOU GOT MONEY TO BUY MORE.: NEVER TRUE

## 2022-11-25 SDOH — ECONOMIC STABILITY: INCOME INSECURITY: IN THE LAST 12 MONTHS, WAS THERE A TIME WHEN YOU WERE NOT ABLE TO PAY THE MORTGAGE OR RENT ON TIME?: NO

## 2022-11-25 SDOH — ECONOMIC STABILITY: FOOD INSECURITY: WITHIN THE PAST 12 MONTHS, THE FOOD YOU BOUGHT JUST DIDN'T LAST AND YOU DIDN'T HAVE MONEY TO GET MORE.: NEVER TRUE

## 2022-11-25 SDOH — ECONOMIC STABILITY: TRANSPORTATION INSECURITY
IN THE PAST 12 MONTHS, HAS LACK OF RELIABLE TRANSPORTATION KEPT YOU FROM MEDICAL APPOINTMENTS, MEETINGS, WORK OR FROM GETTING THINGS NEEDED FOR DAILY LIVING?: NO

## 2022-11-25 SDOH — ECONOMIC STABILITY: HOUSING INSECURITY: IN THE LAST 12 MONTHS, HOW MANY PLACES HAVE YOU LIVED?: 1

## 2022-11-25 SDOH — HEALTH STABILITY: PHYSICAL HEALTH: ON AVERAGE, HOW MANY MINUTES DO YOU ENGAGE IN EXERCISE AT THIS LEVEL?: 50 MIN

## 2022-11-25 SDOH — HEALTH STABILITY: MENTAL HEALTH
STRESS IS WHEN SOMEONE FEELS TENSE, NERVOUS, ANXIOUS, OR CAN'T SLEEP AT NIGHT BECAUSE THEIR MIND IS TROUBLED. HOW STRESSED ARE YOU?: ONLY A LITTLE

## 2022-11-25 ASSESSMENT — SOCIAL DETERMINANTS OF HEALTH (SDOH)
HOW OFTEN DO YOU ATTEND CHURCH OR RELIGIOUS SERVICES?: NEVER
HOW HARD IS IT FOR YOU TO PAY FOR THE VERY BASICS LIKE FOOD, HOUSING, MEDICAL CARE, AND HEATING?: NOT HARD AT ALL
HOW OFTEN DO YOU GET TOGETHER WITH FRIENDS OR RELATIVES?: PATIENT DECLINED
IN A TYPICAL WEEK, HOW MANY TIMES DO YOU TALK ON THE PHONE WITH FAMILY, FRIENDS, OR NEIGHBORS?: MORE THAN THREE TIMES A WEEK
HOW OFTEN DO YOU ATTENT MEETINGS OF THE CLUB OR ORGANIZATION YOU BELONG TO?: NEVER
HOW MANY DRINKS CONTAINING ALCOHOL DO YOU HAVE ON A TYPICAL DAY WHEN YOU ARE DRINKING: 1 OR 2
ARE YOU MARRIED, WIDOWED, DIVORCED, SEPARATED, NEVER MARRIED, OR LIVING WITH A PARTNER?: NEVER MARRIED
HOW OFTEN DO YOU ATTEND CHURCH OR RELIGIOUS SERVICES?: NEVER
ARE YOU MARRIED, WIDOWED, DIVORCED, SEPARATED, NEVER MARRIED, OR LIVING WITH A PARTNER?: NEVER MARRIED
HOW OFTEN DO YOU ATTENT MEETINGS OF THE CLUB OR ORGANIZATION YOU BELONG TO?: NEVER
IN A TYPICAL WEEK, HOW MANY TIMES DO YOU TALK ON THE PHONE WITH FAMILY, FRIENDS, OR NEIGHBORS?: MORE THAN THREE TIMES A WEEK
HOW OFTEN DO YOU HAVE SIX OR MORE DRINKS ON ONE OCCASION: NEVER
WITHIN THE PAST 12 MONTHS, YOU WORRIED THAT YOUR FOOD WOULD RUN OUT BEFORE YOU GOT THE MONEY TO BUY MORE: NEVER TRUE
DO YOU BELONG TO ANY CLUBS OR ORGANIZATIONS SUCH AS CHURCH GROUPS UNIONS, FRATERNAL OR ATHLETIC GROUPS, OR SCHOOL GROUPS?: NO
HOW OFTEN DO YOU GET TOGETHER WITH FRIENDS OR RELATIVES?: PATIENT DECLINED
HOW OFTEN DO YOU HAVE A DRINK CONTAINING ALCOHOL: MONTHLY OR LESS
DO YOU BELONG TO ANY CLUBS OR ORGANIZATIONS SUCH AS CHURCH GROUPS UNIONS, FRATERNAL OR ATHLETIC GROUPS, OR SCHOOL GROUPS?: NO

## 2022-11-25 ASSESSMENT — LIFESTYLE VARIABLES
AUDIT-C TOTAL SCORE: 1
HOW OFTEN DO YOU HAVE SIX OR MORE DRINKS ON ONE OCCASION: NEVER
HOW MANY STANDARD DRINKS CONTAINING ALCOHOL DO YOU HAVE ON A TYPICAL DAY: 1 OR 2
SKIP TO QUESTIONS 9-10: 1
HOW OFTEN DO YOU HAVE A DRINK CONTAINING ALCOHOL: MONTHLY OR LESS

## 2022-11-28 ENCOUNTER — OFFICE VISIT (OUTPATIENT)
Dept: MEDICAL GROUP | Facility: CLINIC | Age: 37
End: 2022-11-28
Payer: MEDICAID

## 2022-11-28 ENCOUNTER — HOSPITAL ENCOUNTER (OUTPATIENT)
Facility: MEDICAL CENTER | Age: 37
End: 2022-11-28
Attending: PHYSICIAN ASSISTANT
Payer: MEDICAID

## 2022-11-28 VITALS
SYSTOLIC BLOOD PRESSURE: 118 MMHG | DIASTOLIC BLOOD PRESSURE: 68 MMHG | RESPIRATION RATE: 20 BRPM | OXYGEN SATURATION: 97 % | BODY MASS INDEX: 32.18 KG/M2 | HEART RATE: 79 BPM | TEMPERATURE: 97.8 F | HEIGHT: 63 IN | WEIGHT: 181.6 LBS

## 2022-11-28 DIAGNOSIS — N89.8 VAGINAL DISCHARGE: ICD-10-CM

## 2022-11-28 DIAGNOSIS — Z23 NEED FOR VACCINATION: ICD-10-CM

## 2022-11-28 PROCEDURE — 99213 OFFICE O/P EST LOW 20 MIN: CPT | Mod: 25 | Performed by: PHYSICIAN ASSISTANT

## 2022-11-28 PROCEDURE — 87480 CANDIDA DNA DIR PROBE: CPT

## 2022-11-28 PROCEDURE — 90472 IMMUNIZATION ADMIN EACH ADD: CPT | Performed by: PHYSICIAN ASSISTANT

## 2022-11-28 PROCEDURE — 90677 PCV20 VACCINE IM: CPT | Performed by: PHYSICIAN ASSISTANT

## 2022-11-28 PROCEDURE — 90471 IMMUNIZATION ADMIN: CPT | Performed by: PHYSICIAN ASSISTANT

## 2022-11-28 PROCEDURE — 87510 GARDNER VAG DNA DIR PROBE: CPT

## 2022-11-28 PROCEDURE — 90686 IIV4 VACC NO PRSV 0.5 ML IM: CPT | Performed by: PHYSICIAN ASSISTANT

## 2022-11-28 PROCEDURE — 90746 HEPB VACCINE 3 DOSE ADULT IM: CPT | Performed by: PHYSICIAN ASSISTANT

## 2022-11-28 PROCEDURE — 87660 TRICHOMONAS VAGIN DIR PROBE: CPT

## 2022-11-28 RX ORDER — FLUCONAZOLE 150 MG/1
TABLET ORAL
Qty: 2 TABLET | Refills: 0 | Status: SHIPPED | OUTPATIENT
Start: 2022-11-28 | End: 2023-05-23

## 2022-11-28 RX ORDER — METRONIDAZOLE 7.5 MG/G
1 GEL VAGINAL
Qty: 70 EACH | Refills: 0 | Status: SHIPPED | OUTPATIENT
Start: 2022-11-28 | End: 2022-12-03

## 2022-11-28 NOTE — LETTER
[facility]  Cynthia Flores P.A.-C.  [address]  [facility] [address]  Fax: [phone]   Authorization for Release/Disclosure of   Protected Health Information   Name: ELYSE ROCHA : 1985 SSN: [id number]   Address: [address] [place] [id number] Phone:    [phone] (home) [phone] (work)   I authorize the entity listed below to release/disclose the PHI below to:   [facility]/Cynthia Flores P.A.-C. and Cynthia Flores P.A.-C.   Provider or Entity Name: [facility] Information     Address   City, State, Zip   Phone:      Fax:     Reason for request: continuity of care   Information to be released:    [  ] LAST COLONOSCOPY,  including any PATH REPORT and follow-up  [  ] LAST FIT/COLOGUARD RESULT [  ] LAST DEXA  [  ] LAST MAMMOGRAM  [  ] LAST PAP  [x] LAST LABS [  ] RETINA EXAM REPORT  [  ] IMMUNIZATION RECORDS  [  ] Release all info      [  ] Check here and initial the line next to each item to release ALL health information INCLUDING  _____ Care and treatment for drug and / or alcohol abuse  _____ HIV testing, infection status, or AIDS  _____ Genetic Testing    DATES OF SERVICE OR TIME PERIOD TO BE DISCLOSED: _____________  I understand and acknowledge that:  * This Authorization may be revoked at any time by you in writing, except if your health information has already been used or disclosed.  * Your health information that will be used or disclosed as a result of you signing this authorization could be re-disclosed by the recipient. If this occurs, your re-disclosed health information may no longer be protected by State or Federal laws.  * You may refuse to sign this Authorization. Your refusal will not affect your ability to obtain treatment.  * This Authorization becomes effective upon signing and will  on (date) __________.      If no date is indicated, this Authorization will  one (1) year from the signature date.    Name: Elyse Paniagua  [facility]    Signature:   Date:     11/28/2022       PLEASE FAX REQUESTED RECORDS BACK TO: [phone]

## 2022-11-28 NOTE — PROGRESS NOTES
"cc:  vaginal odor    Subjective:     Hannah Winters is a 37 y.o. female presenting for vaginal odor      Patient presents to the office for vaginal odor. Patient has had a vaginal odor for about 2 weeks.  She is not sure if she has had a discharge.  She states that she has been using a tampon for the odor.  She states that this has helped.  Nothing makes it worse.  She denies possibility of STI.      Patient is not sure if she is wanting to establish care here.  She states that she is here to have her vaginal odor checked.      Review of systems:  See above.   Denies any symptoms unless previously indicated.        Current Outpatient Medications:     fluconazole (DIFLUCAN) 150 MG tablet, Take one tab weekly for 2 weeks., Disp: 2 Tablet, Rfl: 0    metroNIDAZOLE (METROGEL-VAGINAL) 0.75 % Gel, Insert 1 Applicator into the vagina at bedtime for 5 days., Disp: 70 Each, Rfl: 0    Etonogestrel (NEXPLANON SC), Inject 1 Each under the skin., Disp: , Rfl:     Allergies, past medical history, past surgical history, family history, social history reviewed and updated    Objective:     Vitals: /68 (BP Location: Left arm, Patient Position: Sitting, BP Cuff Size: Adult)   Pulse 79   Temp 36.6 °C (97.8 °F) (Temporal)   Resp 20   Ht 1.588 m (5' 2.5\")   Wt 82.4 kg (181 lb 9.6 oz)   LMP  (LMP Unknown) Comment: implant pt sts 1 every 3-4 months  SpO2 97%   BMI 32.69 kg/m²   General: Alert, pleasant, NAD  EYES:   PERRL, EOMI, no icterus or pallor.  Conjunctivae and lids normal.   HENT:  Normocephalic.  External ears normal.   Neck supple. Respiratory: Normal respiratory effort.    Abdomen: obese  :  white vaginal discharge present. No fishy odor.  No bleeding.    Skin: Warm, dry, no rashes.  Musculoskeletal: Gait is normal.  Moves all extremities well.    Extremities: normal range of motion all extremities..   Neurological: No tremors, sensation grossly intact,  gait is normal, CN2-12 intact.  Psych:  Affect/mood " is normal, judgement is good, memory is intact, grooming is appropriate.    Assessment/Plan:     Hannah was seen today for establish care.    Diagnoses and all orders for this visit:    Vaginal discharge  -     fluconazole (DIFLUCAN) 150 MG tablet; Take one tab weekly for 2 weeks.  -     metroNIDAZOLE (METROGEL-VAGINAL) 0.75 % Gel; Insert 1 Applicator into the vagina at bedtime for 5 days.  -     VAGINAL PATHOGENS DNA PANEL; Future    Vaginal culture collected and will send to the lab for analysis.  We will start patient on Diflucan and MetroGel as indicated.  No improvement in 1 to 2 weeks, patient to notify clinic.    Need for vaccination  -     Hepatitis B Vaccine Adult 20+  -     Pneumococcal Conjugate Vaccine 20-Valent (19 yrs+)  -     INFLUENZA VACCINE QUAD INJ (PF)    Vaccines given today.      No follow-ups on file.    Please note that this dictation was created using voice recognition software. I have made every reasonable attempt to correct obvious errors, but expect that there are errors of grammar and possible content that I did not discover before finalizing note.

## 2022-11-29 DIAGNOSIS — N89.8 VAGINAL DISCHARGE: ICD-10-CM

## 2022-11-29 LAB
CANDIDA DNA VAG QL PROBE+SIG AMP: NEGATIVE
G VAGINALIS DNA VAG QL PROBE+SIG AMP: POSITIVE
T VAGINALIS DNA VAG QL PROBE+SIG AMP: NEGATIVE

## 2022-11-30 ENCOUNTER — OFFICE VISIT (OUTPATIENT)
Dept: URGENT CARE | Facility: PHYSICIAN GROUP | Age: 37
End: 2022-11-30
Payer: MEDICAID

## 2022-11-30 VITALS
TEMPERATURE: 97.7 F | DIASTOLIC BLOOD PRESSURE: 60 MMHG | OXYGEN SATURATION: 98 % | WEIGHT: 182 LBS | HEIGHT: 61 IN | RESPIRATION RATE: 16 BRPM | HEART RATE: 94 BPM | BODY MASS INDEX: 34.36 KG/M2 | SYSTOLIC BLOOD PRESSURE: 110 MMHG

## 2022-11-30 DIAGNOSIS — J11.1 INFLUENZA: ICD-10-CM

## 2022-11-30 LAB
FLUAV+FLUBV AG SPEC QL IA: NORMAL
INT CON NEG: NORMAL
INT CON POS: NORMAL

## 2022-11-30 PROCEDURE — 87804 INFLUENZA ASSAY W/OPTIC: CPT | Performed by: NURSE PRACTITIONER

## 2022-11-30 PROCEDURE — 99213 OFFICE O/P EST LOW 20 MIN: CPT | Performed by: NURSE PRACTITIONER

## 2022-11-30 ASSESSMENT — ENCOUNTER SYMPTOMS
EYES NEGATIVE: 1
MYALGIAS: 1
COUGH: 1
HEADACHES: 1
FEVER: 0
GASTROINTESTINAL NEGATIVE: 1
RHINORRHEA: 1
SORE THROAT: 1
CONSTITUTIONAL NEGATIVE: 1
CARDIOVASCULAR NEGATIVE: 1

## 2022-11-30 NOTE — PROGRESS NOTES
"Subjective:   Hannah Winters is a 37 y.o. female who presents for Cough (Recent vax. Hot cold sweats, cough and congestion body aches x3 days)      Cough  This is a new problem. Episode onset: 3 days. The problem has been gradually worsening. The cough is Non-productive. Associated symptoms include ear congestion, headaches, myalgias, nasal congestion, postnasal drip, rhinorrhea and a sore throat. Pertinent negatives include no fever. Nothing aggravates the symptoms. She has tried OTC cough suppressant and rest for the symptoms. The treatment provided mild relief.     Review of Systems   Constitutional: Negative.  Negative for fever.   HENT:  Positive for congestion, postnasal drip, rhinorrhea and sore throat.    Eyes: Negative.    Respiratory:  Positive for cough.    Cardiovascular: Negative.    Gastrointestinal: Negative.    Musculoskeletal:  Positive for myalgias.   Skin: Negative.    Neurological:  Positive for headaches.     Medications, Allergies, and current problem list reviewed today in Epic.     Objective:     /60   Pulse 94   Temp 36.5 °C (97.7 °F) (Temporal)   Resp 16   Ht 1.549 m (5' 1\")   Wt 82.6 kg (182 lb)   SpO2 98%     Physical Exam  Constitutional:       Appearance: Normal appearance. She is normal weight.   HENT:      Head: Normocephalic and atraumatic.      Right Ear: Tympanic membrane, ear canal and external ear normal.      Left Ear: Tympanic membrane, ear canal and external ear normal.      Nose: Congestion and rhinorrhea present.      Mouth/Throat:      Pharynx: Posterior oropharyngeal erythema present.   Eyes:      Extraocular Movements: Extraocular movements intact.      Conjunctiva/sclera: Conjunctivae normal.      Pupils: Pupils are equal, round, and reactive to light.   Cardiovascular:      Rate and Rhythm: Normal rate and regular rhythm.      Pulses: Normal pulses.   Pulmonary:      Effort: Pulmonary effort is normal.      Breath sounds: Normal breath sounds. "   Musculoskeletal:      Cervical back: Normal range of motion and neck supple.   Skin:     General: Skin is warm and dry.      Capillary Refill: Capillary refill takes less than 2 seconds.   Neurological:      General: No focal deficit present.      Mental Status: She is alert.       Assessment/Plan:     Diagnosis and associated orders:     1. Influenza  POCT Influenza A/B      2. Flu-like symptoms           Comments/MDM:     Advised patient symptoms are viral in etiology, recommend supportive care. Increased fluids and rest.  Recommend over-the-counter cold and flu medications and Tylenol and/or ibuprofen for symptomatic relief and fevers.  Discussed use of nedi-pot, humidifier, and Flonase nasal spray as well.  Discussed good hand hygiene and ways to decrease spread of disease.  Follow-up with PCP return for reevaluation if symptoms persist/worsen.  Patient offered discharge instructions regarding flu.  The patient demonstrated a good understanding and agreed with the treatment plan.            Differential diagnosis, natural history, supportive care, and indications for immediate follow-up discussed.    Advised the patient to follow-up with the primary care physician for recheck, reevaluation, and consideration of further management.    Please note that this dictation was created using voice recognition software. I have made a reasonable attempt to correct obvious errors, but I expect that there are errors of grammar and possibly content that I did not discover before finalizing the note.    This note was electronically signed by KRISTEN Nguyen

## 2023-03-31 ENCOUNTER — OFFICE VISIT (OUTPATIENT)
Dept: MEDICAL GROUP | Facility: CLINIC | Age: 38
End: 2023-03-31
Payer: MEDICAID

## 2023-03-31 VITALS
HEART RATE: 86 BPM | WEIGHT: 195 LBS | DIASTOLIC BLOOD PRESSURE: 78 MMHG | HEIGHT: 62 IN | RESPIRATION RATE: 16 BRPM | SYSTOLIC BLOOD PRESSURE: 116 MMHG | BODY MASS INDEX: 35.88 KG/M2 | OXYGEN SATURATION: 95 %

## 2023-03-31 DIAGNOSIS — G89.29 CHRONIC PAIN OF LEFT KNEE: ICD-10-CM

## 2023-03-31 DIAGNOSIS — M25.562 CHRONIC PAIN OF LEFT KNEE: ICD-10-CM

## 2023-03-31 DIAGNOSIS — E66.09 CLASS 2 OBESITY DUE TO EXCESS CALORIES WITHOUT SERIOUS COMORBIDITY WITH BODY MASS INDEX (BMI) OF 35.0 TO 35.9 IN ADULT: ICD-10-CM

## 2023-03-31 LAB
HBA1C MFR BLD: 5.4 % (ref ?–5.8)
POCT INT CON NEG: NEGATIVE
POCT INT CON POS: POSITIVE

## 2023-03-31 PROCEDURE — 83036 HEMOGLOBIN GLYCOSYLATED A1C: CPT | Performed by: STUDENT IN AN ORGANIZED HEALTH CARE EDUCATION/TRAINING PROGRAM

## 2023-03-31 PROCEDURE — 99214 OFFICE O/P EST MOD 30 MIN: CPT | Mod: GC | Performed by: STUDENT IN AN ORGANIZED HEALTH CARE EDUCATION/TRAINING PROGRAM

## 2023-03-31 ASSESSMENT — PATIENT HEALTH QUESTIONNAIRE - PHQ9: CLINICAL INTERPRETATION OF PHQ2 SCORE: 0

## 2023-03-31 NOTE — PROGRESS NOTES
[facility]    Chief Complaint   Patient presents with    Follow-Up     Weight loss       HISTORY OF PRESENT ILLNESS: Patient is a 37 y.o. female established patient who presents today to discuss the medical issues below:    Problem   Chronic Pain of Left Knee    Patient reports years of left-sided knee pain.  Pain is diffuse, radiates down her leg.  Not associated with movement or activity, not increased with weightbearing.  No swelling of the joint noted.  No erythema of the surrounding skin.  X-rays of bilateral knees performed in October 2022 unremarkable.     Obesity    Current BMI 31.3.  Patient unhappy with her current weight and wishes to lose some.    3/31: BMI today increased to 35.67. Diet has not changed since before. Physical activity decreased.  -Patient admits to eating lots of fast food, dupont's daily. Snacks on high carb foods often. Drinks about 60oz of coke daily.   -Goal is to get to 150lbs.           Patient Active Problem List    Diagnosis Date Noted    Vaginal discharge 11/28/2022    Chronic pain of left knee 09/22/2022    Nexplanon in place 07/28/2022    Neck swelling 07/28/2022    Obesity 11/27/2018    Tobacco dependence syndrome 11/27/2018       Allergies:Patient has no known allergies.    Current Outpatient Medications   Medication Sig Dispense Refill    Semaglutide,0.25 or 0.5MG/DOS, 2 MG/1.5ML Solution Pen-injector Inject 0.25 mg under the skin every 7 days for 28 days, THEN 0.5 mg every 7 days for 28 days. 1 Each 2    Etonogestrel (NEXPLANON SC) Inject 1 Each under the skin.      fluconazole (DIFLUCAN) 150 MG tablet Take one tab weekly for 2 weeks. (Patient not taking: Reported on 3/31/2023) 2 Tablet 0     No current facility-administered medications for this visit.         No past medical history on file.    Social History     Tobacco Use    Smoking status: Some Days     Packs/day: 0.50     Years: 6.00     Pack years: 3.00     Types: Cigarettes    Smokeless tobacco: Never     "Tobacco comments:     Pt sts 1 every once and awhile 22   Vaping Use    Vaping Use: Every day    Substances: Nicotine, Flavoring    Devices: Disposable, Refillable tank   Substance Use Topics    Alcohol use: Not Currently    Drug use: No       Family Status   Relation Name Status    Mo  Alive    Fa  Alive    Bro  Alive    Bro  Alive    MGMo      MGFa      PGMo      PGFa      Mynor  Alive    Son  Alive     Family History   Problem Relation Age of Onset    Breast Cancer Mother     Diabetes Father     No Known Problems Brother     No Known Problems Brother     No Known Problems Maternal Grandmother     Cancer Maternal Grandfather     No Known Problems Paternal Grandmother     No Known Problems Paternal Grandfather     No Known Problems Daughter     No Known Problems Son        ROS:  Negative except as stated above.      Exam:   /78 (BP Location: Left arm, Patient Position: Sitting, BP Cuff Size: Adult)   Pulse 86   Resp 16   Ht 1.575 m (5' 2\")   Wt 88.5 kg (195 lb)   SpO2 95%  Body mass index is 35.67 kg/m².  General:  Well nourished, well developed female in NAD.  HENT: Normocephalic  Pulmonary: Clear to ausculation.  Normal effort. No rales, rhonchi, or wheezing.  Cardiovascular: Regular rate and rhythm without murmur.   Extremities: No LE edema noted. 5/5 strength in all extremities.  No crepitus noted in left knee joint.  No ligamentous laxity noted.  No obvious edema. Nontender to palpation.  Neuro: Grossly nonfocal.  Skin: No lesions, erythema, or other abnormalities noted.  Psych: Alert and oriented to person, place, and time. Appropriate mood and conversation.    Assessment/Plan:    Obesity  -Lab workup ordered (CBC, CMP, lipids, TSH)  -Nutrition referral ordered  -Begin ozempic  -Counseled on healthy eating      Chronic pain of left knee  Knee exam on inspection today within normal limits.  No obvious etiology, will work-up further with MRI of the knee.  - NSAIDs " as needed for pain.        Charles Foster, DO  [facility]  PGY-3

## 2023-03-31 NOTE — ASSESSMENT & PLAN NOTE
-Lab workup ordered (CBC, CMP, lipids, TSH)  -Nutrition referral ordered  -Begin ozempic  -Counseled on healthy eating

## 2023-03-31 NOTE — ASSESSMENT & PLAN NOTE
Knee exam on inspection today within normal limits.  No obvious etiology, will work-up further with MRI of the knee.  - NSAIDs as needed for pain.

## 2023-04-13 ENCOUNTER — HOSPITAL ENCOUNTER (OUTPATIENT)
Dept: RADIOLOGY | Facility: MEDICAL CENTER | Age: 38
End: 2023-04-13
Attending: STUDENT IN AN ORGANIZED HEALTH CARE EDUCATION/TRAINING PROGRAM
Payer: MEDICAID

## 2023-04-13 DIAGNOSIS — G89.29 CHRONIC PAIN OF LEFT KNEE: ICD-10-CM

## 2023-04-13 DIAGNOSIS — M25.562 CHRONIC PAIN OF LEFT KNEE: ICD-10-CM

## 2023-04-13 PROCEDURE — 73721 MRI JNT OF LWR EXTRE W/O DYE: CPT | Mod: LT

## 2023-05-23 ENCOUNTER — OFFICE VISIT (OUTPATIENT)
Dept: MEDICAL GROUP | Facility: CLINIC | Age: 38
End: 2023-05-23
Payer: MEDICAID

## 2023-05-23 VITALS
WEIGHT: 195 LBS | RESPIRATION RATE: 16 BRPM | HEART RATE: 89 BPM | OXYGEN SATURATION: 96 % | BODY MASS INDEX: 35.88 KG/M2 | DIASTOLIC BLOOD PRESSURE: 80 MMHG | SYSTOLIC BLOOD PRESSURE: 118 MMHG | HEIGHT: 62 IN

## 2023-05-23 DIAGNOSIS — M25.562 CHRONIC PAIN OF LEFT KNEE: ICD-10-CM

## 2023-05-23 DIAGNOSIS — G89.29 CHRONIC PAIN OF LEFT KNEE: ICD-10-CM

## 2023-05-23 DIAGNOSIS — E66.09 CLASS 2 OBESITY DUE TO EXCESS CALORIES WITHOUT SERIOUS COMORBIDITY WITH BODY MASS INDEX (BMI) OF 35.0 TO 35.9 IN ADULT: ICD-10-CM

## 2023-05-23 DIAGNOSIS — F33.1 MODERATE EPISODE OF RECURRENT MAJOR DEPRESSIVE DISORDER (HCC): ICD-10-CM

## 2023-05-23 PROCEDURE — 3079F DIAST BP 80-89 MM HG: CPT | Performed by: STUDENT IN AN ORGANIZED HEALTH CARE EDUCATION/TRAINING PROGRAM

## 2023-05-23 PROCEDURE — 99214 OFFICE O/P EST MOD 30 MIN: CPT | Mod: GC | Performed by: STUDENT IN AN ORGANIZED HEALTH CARE EDUCATION/TRAINING PROGRAM

## 2023-05-23 PROCEDURE — 3074F SYST BP LT 130 MM HG: CPT | Performed by: STUDENT IN AN ORGANIZED HEALTH CARE EDUCATION/TRAINING PROGRAM

## 2023-05-23 RX ORDER — CYCLOBENZAPRINE HCL 5 MG
5 TABLET ORAL NIGHTLY PRN
Qty: 30 TABLET | Refills: 1 | Status: SHIPPED | OUTPATIENT
Start: 2023-05-23 | End: 2023-08-04

## 2023-05-23 RX ORDER — MELOXICAM 15 MG/1
15 TABLET ORAL DAILY
Qty: 30 TABLET | Refills: 0 | Status: SHIPPED | OUTPATIENT
Start: 2023-05-23 | End: 2023-08-04

## 2023-05-23 RX ORDER — BUPROPION HYDROCHLORIDE 150 MG/1
150 TABLET ORAL EVERY MORNING
Qty: 30 TABLET | Refills: 1 | Status: SHIPPED | OUTPATIENT
Start: 2023-05-23 | End: 2023-06-27

## 2023-05-23 ASSESSMENT — PATIENT HEALTH QUESTIONNAIRE - PHQ9
SUM OF ALL RESPONSES TO PHQ QUESTIONS 1-9: 20
CLINICAL INTERPRETATION OF PHQ2 SCORE: 6
5. POOR APPETITE OR OVEREATING: 3 - NEARLY EVERY DAY

## 2023-05-23 NOTE — PATIENT INSTRUCTIONS
[facility]  [address]  [address] [place]  [address]  Phone:  [phone]  Fax:  [phone]

## 2023-05-23 NOTE — PROGRESS NOTES
[facility]    Chief Complaint   Patient presents with    Follow-Up       HISTORY OF PRESENT ILLNESS: Patient is a 37 y.o. female established patient who presents today to discuss the medical issues below:    Problem   Moderate Episode of Recurrent Major Depressive Disorder (Hcc)    Patient reports history of depression and mental health disorders.  Reports occasional suicidal thoughts, however denies any specific plan.  Reports support experiences with therapy in the past, not interested in more therapy at this time.  - PHQ-9 score of 20.       Chronic Pain of Left Knee    Patient reports years of left-sided knee pain.  Pain is diffuse, radiates down her leg.  Not associated with movement or activity, not increased with weightbearing.  No swelling of the joint noted.  No erythema of the surrounding skin.  X-rays of bilateral knees performed in October 2022 unremarkable.    5/23/23: Continues to have pain in the left knee despite ibuprofen and Tylenol use.  MRI of the left knee reveals some bone edema of the tibial plateau, likely from biomechanics.  Otherwise intact.  No new changes.       Obesity    Current BMI 31.3.  Patient unhappy with her current weight and wishes to lose some.    3/31: BMI today increased to 35.67. Diet has not changed since before. Physical activity decreased.  -Patient admits to eating lots of fast food, dupont's daily. Snacks on high carb foods often. Drinks about 60oz of coke daily.   -Goal is to get to 150lbs.     5/23/23: Eating more fruits/veggies, however admits to continued eating out frequently.  Minimal exercise.            Patient Active Problem List    Diagnosis Date Noted    Moderate episode of recurrent major depressive disorder (HCC) 05/23/2023    Vaginal discharge 11/28/2022    Chronic pain of left knee 09/22/2022    Nexplanon in place 07/28/2022    Neck swelling 07/28/2022    Obesity 11/27/2018    Tobacco dependence syndrome 11/27/2018       Allergies:Patient has  no known allergies.    Current Outpatient Medications   Medication Sig Dispense Refill    meloxicam (MOBIC) 15 MG tablet Take 1 Tablet by mouth every day. 30 Tablet 0    cyclobenzaprine (FLEXERIL) 5 mg tablet Take 1 Tablet by mouth at bedtime as needed for Muscle Spasms. 30 Tablet 1    buPROPion (WELLBUTRIN XL) 150 MG XL tablet Take 1 Tablet by mouth every morning. 30 Tablet 1    Semaglutide,0.25 or 0.5MG/DOS, 2 MG/1.5ML Solution Pen-injector Inject 0.25 mg under the skin every 7 days for 28 days, THEN 0.5 mg every 7 days for 28 days. 1 Each 2    Etonogestrel (NEXPLANON SC) Inject 1 Each under the skin.       No current facility-administered medications for this visit.         No past medical history on file.    Social History     Tobacco Use    Smoking status: Some Days     Packs/day: 0.50     Years: 6.00     Pack years: 3.00     Types: Cigarettes    Smokeless tobacco: Never    Tobacco comments:     Pt sts 1 every once and awhile 22   Vaping Use    Vaping Use: Every day    Substances: Nicotine, Flavoring    Devices: Disposable, Refillable tank   Substance Use Topics    Alcohol use: Not Currently    Drug use: No       Family Status   Relation Name Status    Mo  Alive    Fa  Alive    Bro  Alive    Bro  Alive    MGMo      MGFa      PGMo      PGFa      Mynor  Alive    Son  Alive     Family History   Problem Relation Age of Onset    Breast Cancer Mother     Diabetes Father     No Known Problems Brother     No Known Problems Brother     No Known Problems Maternal Grandmother     Cancer Maternal Grandfather     No Known Problems Paternal Grandmother     No Known Problems Paternal Grandfather     No Known Problems Daughter     No Known Problems Son        ROS:  Negative except as stated above.      Exam:   Vitals:    23 0902   BP: 118/80   Pulse: 89   Resp: 16   SpO2: 96%    Body mass index is 35.67 kg/m².  General:  Well nourished, well developed female in NAD.  HENT:  Normocephalic, EOMI, PERRLA.  Pulmonary: Clear to ausculation.  Normal effort. No rales, rhonchi, or wheezing.  Cardiovascular: Regular rate and rhythm without murmur.   Abdomen: Soft and nontender, no palpable liver, spleen, or masses.  Extremities: No LE edema noted. 5/5 strength in all extremities.  Left knee mildly tender to palpation on anterior aspect.  Neuro: Grossly nonfocal.  Skin: No lesions, erythema, or other abnormalities noted.  Psych: Alert and oriented to person, place, and time. Appropriate mood and conversation.      Assessment/Plan:    Chronic pain of left knee  - Physical therapy referral sent  - Meloxicam 50 mg daily for 2 weeks, Flexeril at night for sleep.  - If no improvement, consider referral to orthopedic surgery.    Obesity  Encourage patient to continue to address dietary habits, encouraged to follow-up with nutrition referral that was sent previously.  - Still working on prior authorization for Ozempic.  - Patient to begin taking Wellbutrin, possible modest weight loss effect.    Moderate episode of recurrent major depressive disorder (HCC)  Given patient's history of tobacco use and elevated BMI, Wellbutrin a good candidate for this patient.  Begin at 150 mg daily, increase to 300 mg after 1 week if no side effects noted.  - Continue to encourage behavioral therapist.        Charles Foster, DO  [facility]  PGY-3

## 2023-05-23 NOTE — ASSESSMENT & PLAN NOTE
Given patient's history of tobacco use and elevated BMI, Wellbutrin a good candidate for this patient.  Begin at 150 mg daily, increase to 300 mg after 1 week if no side effects noted.  - Continue to encourage behavioral therapist.

## 2023-05-23 NOTE — ASSESSMENT & PLAN NOTE
- Physical therapy referral sent  - Meloxicam 50 mg daily for 2 weeks, Flexeril at night for sleep.  - If no improvement, consider referral to orthopedic surgery.

## 2023-05-23 NOTE — ASSESSMENT & PLAN NOTE
Encourage patient to continue to address dietary habits, encouraged to follow-up with nutrition referral that was sent previously.  - Still working on prior authorization for Ozempic.  - Patient to begin taking Wellbutrin, possible modest weight loss effect.

## 2023-06-18 ENCOUNTER — OFFICE VISIT (OUTPATIENT)
Dept: URGENT CARE | Facility: PHYSICIAN GROUP | Age: 38
End: 2023-06-18
Payer: COMMERCIAL

## 2023-06-18 VITALS
BODY MASS INDEX: 35.88 KG/M2 | OXYGEN SATURATION: 96 % | TEMPERATURE: 97.1 F | WEIGHT: 195 LBS | DIASTOLIC BLOOD PRESSURE: 70 MMHG | HEIGHT: 62 IN | RESPIRATION RATE: 14 BRPM | SYSTOLIC BLOOD PRESSURE: 102 MMHG | HEART RATE: 78 BPM

## 2023-06-18 DIAGNOSIS — J01.00 ACUTE NON-RECURRENT MAXILLARY SINUSITIS: ICD-10-CM

## 2023-06-18 PROCEDURE — 99213 OFFICE O/P EST LOW 20 MIN: CPT | Performed by: FAMILY MEDICINE

## 2023-06-18 PROCEDURE — 3078F DIAST BP <80 MM HG: CPT | Performed by: FAMILY MEDICINE

## 2023-06-18 PROCEDURE — 3074F SYST BP LT 130 MM HG: CPT | Performed by: FAMILY MEDICINE

## 2023-06-18 RX ORDER — AMOXICILLIN AND CLAVULANATE POTASSIUM 875; 125 MG/1; MG/1
1 TABLET, FILM COATED ORAL 2 TIMES DAILY
Qty: 14 TABLET | Refills: 0 | Status: SHIPPED | OUTPATIENT
Start: 2023-06-18 | End: 2023-06-25

## 2023-06-18 NOTE — PROGRESS NOTES
Chief Complaint   Patient presents with    Cough     Congestion, sneezing x9d         Cough  This is a new problem. The current episode started 7 days ago. The problem has been gradually worsening. The problem occurs constantly. The cough is productive of green sputum. Associated symptoms include : headaches,  nasal congestion, nasal discharge.    Pt reports more fatigue than usual.     No objective fevers at home.    States cough is making it hard to sleep at night.   Pertinent negatives include no   nausea, vomiting, diarrhea, sweats, weight loss or wheezing. Nothing aggravates the symptoms.  Patient has tried several OTC cold products for the symptoms - no improvement.  pt does not have a hx of frequent sinusitis     No past medical history on file.      Social History     Tobacco Use    Smoking status: Some Days     Packs/day: 0.50     Years: 6.00     Pack years: 3.00     Types: Cigarettes    Smokeless tobacco: Never    Tobacco comments:     Pt sts 1 every once and awhile 11/28/22   Vaping Use    Vaping Use: Every day    Substances: Nicotine, Flavoring    Devices: Disposable, Refillable tank   Substance Use Topics    Alcohol use: Not Currently    Drug use: No         Family history was reviewed and not pertinent           Review of Systems   Constitutional: Negative for fever, chills and weight loss.   HENT - denies  ear pain.   Positive congestion, sore throat  Eyes: denies vision changes, discharge  Respiratory: Negative for hemoptysis and wheezing.    Cardiovascular: Negative for chest pain or PND.   Gastrointestinal:  No abdominal pain,  nausea, vomiting, diarrhea.  Negative for  blood in stool.    - no discharge, dysuria, frequency.      Neurological: Negative for dizziness.   + headaches.   musculoskeletal - denies myalgias, calf pain  Psych - denies anxiety/depression/mood changes.  Skin: no itching or rash  All other systems reviewed and are negative.             Objective:     Blood pressure  "130/90, pulse 64, temperature 36.2 °C (97.2 °F), resp. rate 16, height 1.651 m (5' 5\"), weight 80.3 kg (177 lb), SpO2 97 %.    Physical Exam   Constitutional: patient is oriented to person, place, and time. Patient appears well-developed and well-nourished. No distress.   HENT:   Head: Normocephalic and atraumatic.   Right Ear: External ear normal.   Left Ear: External ear normal.   TMs both normal  Nose: Mucosal edema  present.   There is tenderness to percussion over left and right sinuses  Mouth/Throat: Mucous membranes are normal. No oral lesions.  There is posterior pharyngeal erythema.  No oropharyngeal exudate or posterior oropharyngeal edema.   Eyes: Conjunctivae and EOM are normal. Pupils are equal, round, and reactive to light. Right eye exhibits no discharge. Left eye exhibits no discharge. No scleral icterus.   Neck: Normal range of motion. Neck supple. No tracheal deviation present.   Cardiovascular: Normal rate, regular rhythm and normal heart sounds.  Exam reveals no friction rub.    Pulmonary/Chest: Effort normal. No respiratory distress. Patient has no wheezes or rhonchi. Patient has no rales.    Musculoskeletal:  exhibits no edema.   Lymphadenopathy:     Patient has no  cervical adenopathy.      Neurological: patient is alert and oriented to person, place, and time.   CN 2-12 intact  Skin: Skin is warm and dry. No rash noted. No erythema.   Psychiatric: patient  has a normal mood and affect.  behavior is normal.   Nursing note and vitals reviewed.              Assessment/Plan:       1. Acute non-recurrent maxillary sinusitis     - amoxicillin-clavulanate (AUGMENTIN) 875-125 MG Tab; Take 1 Tablet by mouth 2 times a day for 7 days.  Dispense: 14 Tablet; Refill: 0       Follow up in one week if no improvement, sooner if symptoms worsen.       "

## 2023-06-27 ENCOUNTER — APPOINTMENT (OUTPATIENT)
Dept: MEDICAL GROUP | Facility: CLINIC | Age: 38
End: 2023-06-27
Payer: COMMERCIAL

## 2023-06-27 DIAGNOSIS — F33.1 MODERATE EPISODE OF RECURRENT MAJOR DEPRESSIVE DISORDER (HCC): ICD-10-CM

## 2023-06-27 RX ORDER — BUPROPION HYDROCHLORIDE 300 MG/1
300 TABLET ORAL EVERY MORNING
Qty: 90 TABLET | Refills: 3 | Status: SHIPPED | OUTPATIENT
Start: 2023-06-27 | End: 2023-08-04

## 2023-07-06 ENCOUNTER — APPOINTMENT (OUTPATIENT)
Dept: URGENT CARE | Facility: PHYSICIAN GROUP | Age: 38
End: 2023-07-06
Payer: COMMERCIAL

## 2023-08-04 ENCOUNTER — OFFICE VISIT (OUTPATIENT)
Dept: MEDICAL GROUP | Facility: CLINIC | Age: 38
End: 2023-08-04
Payer: COMMERCIAL

## 2023-08-04 DIAGNOSIS — R05.8 PRODUCTIVE COUGH: ICD-10-CM

## 2023-08-04 PROCEDURE — 99213 OFFICE O/P EST LOW 20 MIN: CPT | Mod: GE

## 2023-08-04 RX ORDER — NICOTINE 21 MG/24HR
1 PATCH, TRANSDERMAL 24 HOURS TRANSDERMAL EVERY 24 HOURS
Qty: 70 EACH | Refills: 0 | Status: SHIPPED | OUTPATIENT
Start: 2023-08-04 | End: 2023-10-13

## 2023-08-04 RX ORDER — AZITHROMYCIN 250 MG/1
250 TABLET, FILM COATED ORAL DAILY
Qty: 14 TABLET | Refills: 0 | Status: SHIPPED | OUTPATIENT
Start: 2023-08-04 | End: 2023-08-18

## 2023-08-04 NOTE — PROGRESS NOTES
Subjective:     CC:   Chief Complaint   Patient presents with    Establish Care     Coughing up mucus for 3 months, has taken antibiotics, mucus was bright green at first before antibiotics but is not a creamy white.        HPI:   Hannah Winters is a 38 y.o. female who presents to the clinic today for evaluation of 3-month long cough.  Cough has been productive, initially bright green and now has turned into more of a beige color.  No associated symptoms, no shortness of breath chest pain or fevers.  No contact with any sick individuals.  Patient has been to urgent care as well as the hospital and has since completed 2 courses of antibiotics.  No recent imaging or laboratory studies following initiation of symptoms.  Patient currently vapes, has been vaping for the past year and a half.  Previously she was smoking cigarettes.  Her vaping juice contains nicotine.  Patient states that she has been under tremendous amounts of stress at work which is what is leading her to vape.  She has had bad experiences with therapist in the past and though she endorses depression and anxiety she is not currently seeing a therapist.        OB History    Para Term  AB Living   3 2 1 1 1 2   SAB IAB Ectopic Molar Multiple Live Births   0 0 0 0 0 0      She  reports that she is not currently sexually active. She reports using the following method of birth control/protection: Implant.    She  has no past medical history on file.  She  has a past surgical history that includes primary c section (2006) and primary c section (2018).    Family History   Problem Relation Age of Onset    Breast Cancer Mother     Diabetes Father     No Known Problems Brother     No Known Problems Brother     No Known Problems Maternal Grandmother     Cancer Maternal Grandfather     No Known Problems Paternal Grandmother     No Known Problems Paternal Grandfather     No Known Problems Daughter     No Known Problems Son      Social  History     Tobacco Use    Smoking status: Some Days     Packs/day: 0.50     Years: 6.00     Pack years: 3.00     Types: Cigarettes    Smokeless tobacco: Never    Tobacco comments:     Pt sts 1 every once and awhile 11/28/22   Vaping Use    Vaping Use: Every day    Substances: Nicotine, Flavoring    Devices: Disposable, Refillable tank   Substance Use Topics    Alcohol use: Not Currently    Drug use: No       Patient Active Problem List    Diagnosis Date Noted    Moderate episode of recurrent major depressive disorder (HCC) 05/23/2023    Vaginal discharge 11/28/2022    Chronic pain of left knee 09/22/2022    Nexplanon in place 07/28/2022    Neck swelling 07/28/2022    Obesity 11/27/2018    Tobacco dependence syndrome 11/27/2018     Current Outpatient Medications   Medication Sig Dispense Refill    buPROPion (WELLBUTRIN XL) 300 MG XL tablet Take 1 Tablet by mouth every morning. 90 Tablet 3    meloxicam (MOBIC) 15 MG tablet Take 1 Tablet by mouth every day. 30 Tablet 0    cyclobenzaprine (FLEXERIL) 5 mg tablet Take 1 Tablet by mouth at bedtime as needed for Muscle Spasms. 30 Tablet 1    Etonogestrel (NEXPLANON SC) Inject 1 Each under the skin. (Patient not taking: Reported on 6/18/2023)       No current facility-administered medications for this visit.     No Known Allergies    Review of Systems   Constitutional: Negative for fever, chills and malaise/fatigue.   HENT: Negative for congestion.    Eyes: Negative for pain.   Respiratory: Positive for cough, productive.  Cardiovascular: Negative for chest pain and leg swelling.   Gastrointestinal: Negative for nausea, vomiting, abdominal pain and diarrhea.   Genitourinary: Negative for dysuria and hematuria.   Skin: Negative for rash.   Neurological: Negative for dizziness, focal weakness and headaches.   Endo/Heme/Allergies: Does not bruise/bleed easily.   Psychiatric/Behavioral: Negative for depression.  The patient is not nervous/anxious.      Objective:   BP (P)  "112/76 (BP Location: Right arm, Patient Position: Sitting, BP Cuff Size: Adult)   Pulse (P) 96   Temp (P) 36.7 °C (98 °F) (Temporal)   Ht (P) 1.575 m (5' 2\")   Wt (P) 91 kg (200 lb 9.6 oz)   SpO2 (P) 96%   BMI (P) 36.69 kg/m²     Wt Readings from Last 4 Encounters:   08/04/23 (P) 91 kg (200 lb 9.6 oz)   06/18/23 88.5 kg (195 lb)   05/23/23 88.5 kg (195 lb)   03/31/23 88.5 kg (195 lb)         Physical Exam:  Constitutional: Well-developed and well-nourished. Not diaphoretic. No distress.   Skin: Skin is warm and dry. No rash noted.  Head: Atraumatic without lesions.  Eyes: Conjunctivae and extraocular motions are normal. Pupils are equal, round, and reactive to light. No scleral icterus.   Nose: Nares patent. Septum midline. Turbinates without erythema nor edema. No discharge.   Mouth/Throat: Tongue normal. Oropharynx is clear and moist. Posterior pharynx without erythema or exudates.  Neck: Supple, trachea midline. Normal range of motion. No thyromegaly present. No lymphadenopathy--cervical or supraclavicular.  Cardiovascular: Regular rate and rhythm, S1 and S2 without murmur, rubs, or gallops.    Respiratory: Effort normal. Clear to auscultation throughout. No adventitious sounds.   Abdomen: Soft, non tender, and without distention. Active bowel sounds in all four quadrants. No rebound, guarding, masses or HSM.  Extremities: No cyanosis, clubbing, erythema, nor edema. Distal pulses intact and symmetric.   Musculoskeletal: All major joints AROM full in all directions without pain.  Psychiatric:  Behavior, mood, and affect are appropriate.    Assessment and Plan:   38-year-old female presenting with cough for 3 months, productive, in the setting of a known active smoker/vaper.  Differentials include COPD exacerbation, chronic bronchitis, bacterial pneumonia.  Based on history of presenting illness as well as past medical history and social history, likely secondary to inflammatory response.  We will likely " initiate steroid taper however patient still actively vaping.  Therefore will obtain chest x-ray as well as restart a course of antibiotics and obtain laboratory studies.  Follow-up in 3 weeks.  Patient also willing to try nicotine patches in an effort to quit vaping altogether.  Bupropion was not helpful and patient has discontinued 2 weeks ago.    1. Productive cough  - DX-CHEST-2 VIEWS; Future  - CBC WITH DIFFERENTIAL; Future  - PROCALCITONIN; Future    Other orders  - nicotine (NICODERM) 14 MG/24HR PATCH 24 HR; Place 1 Patch on the skin every 24 hours for 70 days.  Dispense: 70 Each; Refill: 0  - azithromycin (ZITHROMAX) 250 MG Tab; Take 1 Tablet by mouth every day for 14 days.  Dispense: 14 Tablet; Refill: 0      Follow-up: 3 weeks

## 2023-08-09 ENCOUNTER — HOSPITAL ENCOUNTER (OUTPATIENT)
Dept: LAB | Facility: MEDICAL CENTER | Age: 38
End: 2023-08-09
Payer: COMMERCIAL

## 2023-08-09 ENCOUNTER — APPOINTMENT (OUTPATIENT)
Dept: RADIOLOGY | Facility: IMAGING CENTER | Age: 38
End: 2023-08-09
Payer: COMMERCIAL

## 2023-08-09 ENCOUNTER — NON-PROVIDER VISIT (OUTPATIENT)
Dept: URGENT CARE | Facility: PHYSICIAN GROUP | Age: 38
End: 2023-08-09
Payer: COMMERCIAL

## 2023-08-09 DIAGNOSIS — R05.8 PRODUCTIVE COUGH: ICD-10-CM

## 2023-08-09 LAB
BASOPHILS # BLD AUTO: 0.4 % (ref 0–1.8)
BASOPHILS # BLD: 0.03 K/UL (ref 0–0.12)
EOSINOPHIL # BLD AUTO: 0.01 K/UL (ref 0–0.51)
EOSINOPHIL NFR BLD: 0.1 % (ref 0–6.9)
ERYTHROCYTE [DISTWIDTH] IN BLOOD BY AUTOMATED COUNT: 42.3 FL (ref 35.9–50)
HCT VFR BLD AUTO: 41 % (ref 37–47)
HGB BLD-MCNC: 13.2 G/DL (ref 12–16)
IMM GRANULOCYTES # BLD AUTO: 0.02 K/UL (ref 0–0.11)
IMM GRANULOCYTES NFR BLD AUTO: 0.3 % (ref 0–0.9)
LYMPHOCYTES # BLD AUTO: 2.17 K/UL (ref 1–4.8)
LYMPHOCYTES NFR BLD: 28.4 % (ref 22–41)
MCH RBC QN AUTO: 29.7 PG (ref 27–33)
MCHC RBC AUTO-ENTMCNC: 32.2 G/DL (ref 32.2–35.5)
MCV RBC AUTO: 92.1 FL (ref 81.4–97.8)
MONOCYTES # BLD AUTO: 0.49 K/UL (ref 0–0.85)
MONOCYTES NFR BLD AUTO: 6.4 % (ref 0–13.4)
NEUTROPHILS # BLD AUTO: 4.92 K/UL (ref 1.82–7.42)
NEUTROPHILS NFR BLD: 64.4 % (ref 44–72)
NRBC # BLD AUTO: 0 K/UL
NRBC BLD-RTO: 0 /100 WBC (ref 0–0.2)
PLATELET # BLD AUTO: 297 K/UL (ref 164–446)
PMV BLD AUTO: 9.9 FL (ref 9–12.9)
PROCALCITONIN SERPL-MCNC: <0.05 NG/ML
RBC # BLD AUTO: 4.45 M/UL (ref 4.2–5.4)
WBC # BLD AUTO: 7.6 K/UL (ref 4.8–10.8)

## 2023-08-09 PROCEDURE — 85025 COMPLETE CBC W/AUTO DIFF WBC: CPT

## 2023-08-09 PROCEDURE — 84145 PROCALCITONIN (PCT): CPT

## 2023-08-09 PROCEDURE — 71046 X-RAY EXAM CHEST 2 VIEWS: CPT | Mod: TC,FY | Performed by: RADIOLOGY

## 2023-08-09 PROCEDURE — 36415 COLL VENOUS BLD VENIPUNCTURE: CPT

## 2023-09-01 ENCOUNTER — OFFICE VISIT (OUTPATIENT)
Dept: MEDICAL GROUP | Facility: CLINIC | Age: 38
End: 2023-09-01
Payer: COMMERCIAL

## 2023-09-01 VITALS
OXYGEN SATURATION: 95 % | BODY MASS INDEX: 37.73 KG/M2 | HEART RATE: 85 BPM | WEIGHT: 205 LBS | TEMPERATURE: 97.5 F | RESPIRATION RATE: 16 BRPM | DIASTOLIC BLOOD PRESSURE: 66 MMHG | HEIGHT: 62 IN | SYSTOLIC BLOOD PRESSURE: 120 MMHG

## 2023-09-01 DIAGNOSIS — Z72.89 CURRENT EVERY DAY VAPING: ICD-10-CM

## 2023-09-01 DIAGNOSIS — M25.562 CHRONIC PAIN OF LEFT KNEE: ICD-10-CM

## 2023-09-01 DIAGNOSIS — Z97.5 NEXPLANON IN PLACE: ICD-10-CM

## 2023-09-01 DIAGNOSIS — G89.29 CHRONIC PAIN OF LEFT KNEE: ICD-10-CM

## 2023-09-01 DIAGNOSIS — R05.8 PRODUCTIVE COUGH: ICD-10-CM

## 2023-09-01 PROCEDURE — 3074F SYST BP LT 130 MM HG: CPT

## 2023-09-01 PROCEDURE — 3078F DIAST BP <80 MM HG: CPT

## 2023-09-01 PROCEDURE — 99213 OFFICE O/P EST LOW 20 MIN: CPT | Mod: GE

## 2023-09-01 RX ORDER — MELOXICAM 7.5 MG/1
15 TABLET ORAL DAILY
Qty: 28 TABLET | Refills: 0 | Status: SHIPPED | OUTPATIENT
Start: 2023-09-01 | End: 2023-09-15

## 2023-09-01 RX ORDER — CYCLOBENZAPRINE HCL 5 MG
5 TABLET ORAL 2 TIMES DAILY PRN
Qty: 28 TABLET | Refills: 0 | Status: SHIPPED | OUTPATIENT
Start: 2023-09-01 | End: 2023-11-06

## 2023-09-01 NOTE — PROGRESS NOTES
Subjective:     CC:   Chief Complaint   Patient presents with    Lab Results       HPI:   Hannah Winters is a 38 y.o. female who presents to clinic today for follow-up on recent cough.  She also presents today to discuss her laboratory studies which were ordered for assessment of her chronic cough.  Laboratory studies included CBC with differential as well as a procalcitonin in addition to a chest x-ray.  Cough has largely subsided and laboratory studies are reassuring.  Calcitonin within normal limits and chest x-ray unremarkable.  Given patient's remarkable history of vaping use and tobacco use, her symptoms are likely inflammatory although they have resolved.    She also presents to discuss her chronic left knee pain for which she has had bilateral x-rays as well as an MRI.  Imaging studies from a couple of months ago reviewed at bedside.  She endorses using twice daily limited both ibuprofen and Tylenol for relief.  States the Flexeril and meloxicam were both working for her previously.  Exacerbating factors include standing for a long time.  Alleviating factors include only Flexeril and meloxicam.      OB History    Para Term  AB Living   3 2 1 1 1 2   SAB IAB Ectopic Molar Multiple Live Births   0 0 0 0 0 0      She  reports that she is not currently sexually active. She reports using the following method of birth control/protection: Implant.    She  has no past medical history on file.  She  has a past surgical history that includes primary c section (2006) and primary c section (2018).    Family History   Problem Relation Age of Onset    Breast Cancer Mother     Diabetes Father     No Known Problems Brother     No Known Problems Brother     No Known Problems Maternal Grandmother     Cancer Maternal Grandfather     No Known Problems Paternal Grandmother     No Known Problems Paternal Grandfather     No Known Problems Daughter     No Known Problems Son      Social History     Tobacco  Use    Smoking status: Some Days     Current packs/day: 0.50     Average packs/day: 0.5 packs/day for 6.0 years (3.0 ttl pk-yrs)     Types: Cigarettes    Smokeless tobacco: Never    Tobacco comments:     Pt sts 1 every once and awhile 11/28/22   Vaping Use    Vaping Use: Every day    Substances: Nicotine, Flavoring    Devices: Disposable, Refillable tank   Substance Use Topics    Alcohol use: Not Currently    Drug use: No       Patient Active Problem List    Diagnosis Date Noted    Moderate episode of recurrent major depressive disorder (HCC) 05/23/2023    Vaginal discharge 11/28/2022    Chronic pain of left knee 09/22/2022    Nexplanon in place 07/28/2022    Neck swelling 07/28/2022    Obesity 11/27/2018    Tobacco dependence syndrome 11/27/2018     Current Outpatient Medications   Medication Sig Dispense Refill    nicotine (NICODERM) 14 MG/24HR PATCH 24 HR Place 1 Patch on the skin every 24 hours for 70 days. 70 Each 0    Etonogestrel (NEXPLANON SC) Inject 1 Each under the skin. (Patient not taking: Reported on 6/18/2023)       No current facility-administered medications for this visit.     No Known Allergies    Review of Systems   Constitutional: Negative for fever, chills and malaise/fatigue.   HENT: Negative for congestion.    Eyes: Negative for pain.   Respiratory: Negative for cough and shortness of breath.    Cardiovascular: Negative for chest pain and leg swelling.   Gastrointestinal: Negative for nausea, vomiting, abdominal pain and diarrhea.   Genitourinary: Negative for dysuria and hematuria.   Skin: Negative for rash.   Neurological: Negative for dizziness, focal weakness and headaches.   Endo/Heme/Allergies: Does not bruise/bleed easily.   Psychiatric/Behavioral: Negative for depression.  The patient is not nervous/anxious.      Objective:   /66 (BP Location: Left arm, Patient Position: Sitting, BP Cuff Size: Adult)   Pulse 85   Temp 36.4 °C (97.5 °F) (Temporal)   Resp 16   Ht 1.575 m (5'  "2\")   Wt 93 kg (205 lb)   SpO2 95%   BMI 37.49 kg/m²     Wt Readings from Last 4 Encounters:   08/04/23 (P) 91 kg (200 lb 9.6 oz)   06/18/23 88.5 kg (195 lb)   05/23/23 88.5 kg (195 lb)   03/31/23 88.5 kg (195 lb)         Physical Exam:  Constitutional: Well-developed and well-nourished. Not diaphoretic. No distress.   Skin: Skin is warm and dry. No rash noted.  Head: Atraumatic without lesions.  Eyes: Conjunctivae and extraocular motions are normal. Pupils are equal, round, and reactive to light. No scleral icterus.   Nose: Nares patent. Septum midline. Turbinates without erythema nor edema. No discharge.   Mouth/Throat: Tongue normal. Oropharynx is clear and moist. Posterior pharynx without erythema or exudates.  Neck: Supple, trachea midline. Normal range of motion. No thyromegaly present. No lymphadenopathy--cervical or supraclavicular.  Cardiovascular: Regular rate and rhythm, S1 and S2 without murmur, rubs, or gallops.    Respiratory: Effort normal. Clear to auscultation throughout. No adventitious sounds.   Abdomen: Soft, non tender, and without distention. Active bowel sounds in all four quadrants. No rebound, guarding, masses or HSM.  Extremities: No cyanosis, clubbing, erythema, nor edema. Distal pulses intact and symmetric.   Musculoskeletal: All major joints AROM full in all directions without pain.  Neurological: Alert and oriented x 3. Grossly non-focal. Strength and sensation grossly intact. DTRs 2+/3 and symmetric.   Psychiatric:  Behavior, mood, and affect are appropriate.    Assessment and Plan:     Productive cough  Chest x-ray reassuring  CBC with differential as well as procalcitonin reassuring  Status post 2 rounds of antibiotics including Zithromax for 14 days  She is currently still vaping due to significant life circumstances currently going through court proceedings  Cough has improved and is no longer present    2.  Current tobacco and vaping use  Nicotine patches ordered during last " visit  She has not yet started them however counseled on tobacco use cessation and vaping use cessation  Patient verbalized understanding  She plans to start using her nicotine patches after the court proceedings are over  Has been smoking cigarettes since the age of 11 switched over to vaping this year    3. Knee Pain, Left Chronic   She has had x-rays of both knees largely unremarkable  MRI of the left knees reveals Bone marrow edema within the anterolateral tibial plateau with no overlying cartilage abnormality identified this could be a bone contusion or related to overuse/altered biomechanics  Joint effusion with associated popliteal cyst measuring 3.4 cm and 2.6 cm  Patient advised to stop taking significant months of Tylenol and ibuprofen daily which exceed daily limit on both  We will do meloxicam as well as Flexeril for 2 weeks while we get her back in for joint injection       Patient counseled about skin care, diet, supplements, and exercise.      Follow-up: 3 months

## 2023-09-20 ENCOUNTER — TELEPHONE (OUTPATIENT)
Dept: MEDICAL GROUP | Facility: CLINIC | Age: 38
End: 2023-09-20
Payer: COMMERCIAL

## 2023-09-21 ENCOUNTER — OFFICE VISIT (OUTPATIENT)
Dept: MEDICAL GROUP | Facility: CLINIC | Age: 38
End: 2023-09-21
Payer: COMMERCIAL

## 2023-09-21 VITALS
SYSTOLIC BLOOD PRESSURE: 112 MMHG | RESPIRATION RATE: 16 BRPM | WEIGHT: 208 LBS | TEMPERATURE: 96.9 F | HEART RATE: 74 BPM | OXYGEN SATURATION: 96 % | BODY MASS INDEX: 38.28 KG/M2 | HEIGHT: 62 IN | DIASTOLIC BLOOD PRESSURE: 68 MMHG

## 2023-09-21 DIAGNOSIS — Z30.46 NEXPLANON REMOVAL: ICD-10-CM

## 2023-09-21 DIAGNOSIS — Z30.017 NEXPLANON INSERTION: Primary | ICD-10-CM

## 2023-09-21 PROCEDURE — 3078F DIAST BP <80 MM HG: CPT

## 2023-09-21 PROCEDURE — 11983 REMOVE/INSERT DRUG IMPLANT: CPT

## 2023-09-21 PROCEDURE — 3074F SYST BP LT 130 MM HG: CPT

## 2023-09-21 NOTE — LETTER
[facility]     September 21, 2023    Patient: Hannah Winters   YOB: 1985   Date of Visit: 9/21/2023       To Whom It May Concern:    Hannah Winters was seen and treated in our department on 9/21/2023. Pt had a procedure performed on her left arm today and will not be able to lift heavy objects greater than 10 pounds for the next 3-4 days. Thank you.     Sincerely,     Jennifer Antonio M.D.

## 2023-09-21 NOTE — PROGRESS NOTES
"Subjective:     CC: Nexplanon     HPI:   Hannah presents today for Nexplanon insertion. Patient has no questions or concerns. Her previous Nexplanon was tolerated well. Menstruation has been lighter since implant insertion.     Current Outpatient Medications Ordered in Epic   Medication Sig Dispense Refill    nicotine (NICODERM) 14 MG/24HR PATCH 24 HR Place 1 Patch on the skin every 24 hours for 70 days. 70 Each 0     Current Facility-Administered Medications Ordered in Epic   Medication Dose Route Frequency Provider Last Rate Last Admin    etonogestrel (Nexplanon) implant 1 Each  1 Each Subcutaneous Once Jennifer Antonio M.D.         ROS:  Gen: no fevers/chills, no changes in weight  Pulm: no sob, no cough  CV: no chest pain, no palpitations  GI: no nausea/vomiting, no diarrhea    Objective:     Exam:  /68 (BP Location: Right arm, Patient Position: Sitting, BP Cuff Size: Large adult)   Pulse 74   Temp 36.1 °C (96.9 °F) (Temporal)   Resp 16   Ht 1.575 m (5' 2\")   Wt 94.3 kg (208 lb)   LMP 09/03/2023 (Approximate)   SpO2 96%   BMI 38.04 kg/m²  Body mass index is 38.04 kg/m².    Gen: Alert and oriented, No apparent distress.  Neck: Neck is supple without lymphadenopathy.  Lungs: Normal effort, CTA bilaterally, no wheezes, rhonchi, or rales  CV: Regular rate and rhythm. No murmurs, rubs, or gallops.  Ext: No clubbing, cyanosis, edema.    Assessment & Plan:     38 y.o. female with the following -     1. Nexplanon removal  Patient here for Nexplanon and removal and re insertion of new Nexplanon Reviewed procedure with patient.  All questions were answered.  Consent obtained.  Post procedure instructions reviewed with patient.    PROCEDURE: The left arm was prepped with betadine.  The area to distal tip of implant was infiltrated with 2 cc of 2% lidocaine.  An incision was made with scalpel and the end of the implant was visualized.  Using a forcep, the implant was grasped, dissected and removed intact.  " Steristrips placed over incision and a pressure dressing was applied.  Patient tolerated procedure well without complications.       2. Nexplanon insertion  Confirmed the expiration date of the implant on the 's box. NDC number of the implant is [id number]. The implant card was given to the patient.     PROCEDURE: The left arm was measured and marked- 10 cm proximal to the medial epicondyle, 3 cm inferior to the sulcus.  The area was prepped with betadine and infiltrated with  2 cc of 2% lidocaine. The nexplanon device was inserted easily and the implant deployed.  Placement of the implant was confirmed. A pressure dressing was applied.  Patient tolerated procedure well without complications.      Follow up in 2 months for Nexplanon follow up     Jennifer Antonio M.D.  PGY-2

## 2023-10-02 ENCOUNTER — OFFICE VISIT (OUTPATIENT)
Dept: MEDICAL GROUP | Facility: CLINIC | Age: 38
End: 2023-10-02
Payer: COMMERCIAL

## 2023-10-02 VITALS
BODY MASS INDEX: 38.28 KG/M2 | OXYGEN SATURATION: 96 % | TEMPERATURE: 98.4 F | DIASTOLIC BLOOD PRESSURE: 78 MMHG | SYSTOLIC BLOOD PRESSURE: 138 MMHG | HEIGHT: 62 IN | WEIGHT: 208 LBS | HEART RATE: 81 BPM

## 2023-10-02 DIAGNOSIS — U07.0 VAPING-RELATED DISORDER: ICD-10-CM

## 2023-10-02 DIAGNOSIS — E66.9 OBESITY (BMI 35.0-39.9 WITHOUT COMORBIDITY): ICD-10-CM

## 2023-10-02 PROCEDURE — 3075F SYST BP GE 130 - 139MM HG: CPT

## 2023-10-02 PROCEDURE — 99213 OFFICE O/P EST LOW 20 MIN: CPT | Mod: GE

## 2023-10-02 PROCEDURE — 3078F DIAST BP <80 MM HG: CPT

## 2023-10-02 NOTE — PROGRESS NOTES
Subjective:     CC:   Chief Complaint   Patient presents with    [facility]     Nicotine patches, left knee pain cyst growing on it.       HPI:   Hannah Winters is a 38 y.o. female who presents for follow-up on smoking cessation.  Patient has been vaping every day due to increased life stressors.  She currently lives at home with her parents and works 2 jobs.  She spends about over $20 a week on vaping related supplies.  During her last visit for which she was being evaluated for chronic cough likely secondary to chronic vaping use disorder, patient was started on nicotine patches.  She presents today for follow-up to establish how well the nicotine patches are working.  Unfortunately patient has not began to use her nicotine patches.  She is currently still vaping although she says she has cut back however cannot quantify the amount she has cut back.  Additionally she is concerned about her weight gain.  She states that previously she has tried to be on Ozempic however given her A1c she did not qualify this to be covered through her insurance.  Given her weight gain discussed that she may need to retest her A1c, in addition to lipid panel.        OB History    Para Term  AB Living   3 2 1 1 1 2   SAB IAB Ectopic Molar Multiple Live Births   0 0 0 0 0 0      She  reports that she is not currently sexually active. She reports using the following method of birth control/protection: Implant.    She  has no past medical history on file.  She  has a past surgical history that includes primary c section (2006) and primary c section (2018).    Family History   Problem Relation Age of Onset    Breast Cancer Mother     Diabetes Father     No Known Problems Brother     No Known Problems Brother     No Known Problems Maternal Grandmother     Cancer Maternal Grandfather     No Known Problems Paternal Grandmother     No Known Problems Paternal Grandfather     No Known Problems Daughter     No Known  "Problems Son      Social History     Tobacco Use    Smoking status: Some Days     Current packs/day: 0.50     Average packs/day: 0.5 packs/day for 6.0 years (3.0 ttl pk-yrs)     Types: Cigarettes    Smokeless tobacco: Never    Tobacco comments:     Pt sts 1 every once and awhile 11/28/22   Vaping Use    Vaping Use: Every day    Substances: Nicotine, Flavoring    Devices: Disposable, Refillable tank   Substance Use Topics    Alcohol use: Not Currently    Drug use: No       Patient Active Problem List    Diagnosis Date Noted    Moderate episode of recurrent major depressive disorder (HCC) 05/23/2023    Vaginal discharge 11/28/2022    Chronic pain of left knee 09/22/2022    Nexplanon in place 07/28/2022    Neck swelling 07/28/2022    Obesity 11/27/2018    Tobacco dependence syndrome 11/27/2018     Current Outpatient Medications   Medication Sig Dispense Refill    nicotine (NICODERM) 14 MG/24HR PATCH 24 HR Place 1 Patch on the skin every 24 hours for 70 days. 70 Each 0     No current facility-administered medications for this visit.     No Known Allergies    Review of Systems   Constitutional: Negative for fever, chills and malaise/fatigue.   HENT: Negative for congestion.    Eyes: Negative for pain.   Respiratory: Negative for cough and shortness of breath.    Cardiovascular: Negative for chest pain and leg swelling.   Gastrointestinal: Negative for nausea, vomiting, abdominal pain and diarrhea.   Genitourinary: Negative for dysuria and hematuria.   Skin: Negative for rash.   Neurological: Negative for dizziness, focal weakness and headaches.   Endo/Heme/Allergies: Does not bruise/bleed easily.   Psychiatric/Behavioral: Negative for depression.  The patient is not nervous/anxious.      Objective:   /78 (BP Location: Right arm, Patient Position: Sitting, BP Cuff Size: Adult)   Pulse 81   Temp 36.9 °C (98.4 °F) (Temporal)   Ht 1.562 m (5' 1.5\")   Wt 94.3 kg (208 lb)   LMP 09/03/2023 (Approximate)   SpO2 96%  "  BMI 38.66 kg/m²     Wt Readings from Last 4 Encounters:   09/21/23 94.3 kg (208 lb)   09/01/23 93 kg (205 lb)   08/04/23 (P) 91 kg (200 lb 9.6 oz)   06/18/23 88.5 kg (195 lb)       Physical Exam:  Constitutional: Well-developed and well-nourished. Not diaphoretic. No distress.   Skin: Skin is warm and dry. No rash noted.  Head: Atraumatic without lesions.  Eyes: Conjunctivae and extraocular motions are normal. Pupils are equal, round, and reactive to light. No scleral icterus.   Nose: Nares patent. Septum midline. Turbinates without erythema nor edema. No discharge.   Mouth/Throat: Tongue normal. Oropharynx is clear and moist. Posterior pharynx without erythema or exudates.  Neck: Supple, trachea midline. Normal range of motion.   Cardiovascular: Regular rate and rhythm, S1 and S2 without murmur, rubs, or gallops.    Respiratory: Effort normal. Clear to auscultation throughout. No adventitious sounds.   Abdomen: Soft, non tender, and without distention. Active bowel sounds in all four quadrants. No rebound, guarding, masses or HSM.  Extremities: No cyanosis, clubbing, erythema, nor edema. Distal pulses intact and symmetric.   Musculoskeletal: All major joints AROM full in all directions without pain.  Neurological: Alert and oriented x 3. Grossly non-focal.   Psychiatric:  Behavior, mood, and affect are appropriate.    Assessment and Plan:     38-year-old female presenting for follow-up on vaping use disorder.  She is currently still vaping, she would like to quit however has not tried to use her nicotine patches at this point.    We discussed the importance of adhering to pharmacotherapy for vaping use cessation.  Patient verbalized understanding.  She states she will try using her patches this week.  Provided counseling and reassurance.  Her cough is completely now gone.    Also discussed weight gain, we will obtain lipid panel as well as a new A1c to assess if patient may now qualify for Ozempic.  Discussed  weight loss lifestyle modifications including diet and exercise.  Patient verbalized understanding.    1. Obesity (BMI 35.0-39.9 without comorbidity)  - HEMOGLOBIN A1C; Future  - LIPID PANEL WITH LDL/HDL RATIO    2. Vaping-related disorder        Follow-up: 1 month

## 2023-11-03 ENCOUNTER — OFFICE VISIT (OUTPATIENT)
Dept: MEDICAL GROUP | Facility: CLINIC | Age: 38
End: 2023-11-03
Payer: COMMERCIAL

## 2023-11-03 VITALS
WEIGHT: 209 LBS | DIASTOLIC BLOOD PRESSURE: 68 MMHG | BODY MASS INDEX: 38.46 KG/M2 | HEIGHT: 62 IN | HEART RATE: 81 BPM | OXYGEN SATURATION: 97 % | TEMPERATURE: 98 F | SYSTOLIC BLOOD PRESSURE: 122 MMHG

## 2023-11-03 DIAGNOSIS — G89.29 CHRONIC PAIN OF LEFT KNEE: ICD-10-CM

## 2023-11-03 DIAGNOSIS — M25.562 CHRONIC PAIN OF LEFT KNEE: ICD-10-CM

## 2023-11-03 DIAGNOSIS — M25.521 ARTHRALGIA OF RIGHT ELBOW: ICD-10-CM

## 2023-11-03 DIAGNOSIS — F17.290 NICOTINE DEPENDENCE DUE TO VAPING TOBACCO PRODUCT: ICD-10-CM

## 2023-11-03 DIAGNOSIS — E66.9 OBESITY (BMI 35.0-39.9 WITHOUT COMORBIDITY): ICD-10-CM

## 2023-11-03 DIAGNOSIS — D23.30 BLUE NEVUS OF FACE: ICD-10-CM

## 2023-11-03 DIAGNOSIS — Z13.9 DUE FOR SCREENING: ICD-10-CM

## 2023-11-03 PROCEDURE — 99214 OFFICE O/P EST MOD 30 MIN: CPT | Mod: GC

## 2023-11-03 PROCEDURE — 3078F DIAST BP <80 MM HG: CPT

## 2023-11-03 PROCEDURE — 3074F SYST BP LT 130 MM HG: CPT

## 2023-11-03 NOTE — PROGRESS NOTES
Subjective:     CC:   Chief Complaint   Patient presents with    Establish Care     Having issues with knees and right arm wasn't able to get test done       HPI:   Hannah Winters is a 38 y.o. female who presents to the clinic for follow up on left knee pain ongoing now for several months. Recent imaging did reveal bone marrow edema within the anterior lateral tibial plateau with no overlying cartilage abnormality likely secondary to overuse altered biomechanics and additionally found joint effusion with popliteal cyst measuring 3.4 cm and 2.6 cm with intact menisci and ligaments.  Knee pain continues despite ongoing attempted weight loss.  Patient has failed previous measures and will benefit from knee injection at this time. Although ordered, she has not yet started PT.     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  Patient states this has been bothering her some for her and does not let her sleep.    Patient has recently discontinued vaping altogether, congratulated on her efforts.    Patient due for age based screening per below.      OB History    Para Term  AB Living   3 2 1 1 1 2   SAB IAB Ectopic Molar Multiple Live Births   0 0 0 0 0 0      She  reports that she is not currently sexually active. She reports using the following method of birth control/protection: Implant.    She  has no past medical history on file.  She  has a past surgical history that includes primary c section (2006) and primary c section (2018).    Family History   Problem Relation Age of Onset    Breast Cancer Mother     Diabetes Father     No Known Problems Brother     No Known Problems Brother     No Known Problems Maternal Grandmother     Cancer Maternal Grandfather     No Known Problems Paternal Grandmother     No Known Problems Paternal Grandfather     No Known  "Problems Daughter     No Known Problems Son      Social History     Tobacco Use    Smoking status: Some Days     Current packs/day: 0.50     Average packs/day: 0.5 packs/day for 6.0 years (3.0 ttl pk-yrs)     Types: Cigarettes    Smokeless tobacco: Never    Tobacco comments:     Pt sts 1 every once and awhile 11/28/22   Vaping Use    Vaping Use: Every day    Substances: Nicotine, Flavoring    Devices: Disposable, Refillable tank   Substance Use Topics    Alcohol use: Not Currently    Drug use: No       Patient Active Problem List    Diagnosis Date Noted    Moderate episode of recurrent major depressive disorder (HCC) 05/23/2023    Vaginal discharge 11/28/2022    Chronic pain of left knee 09/22/2022    Nexplanon in place 07/28/2022    Neck swelling 07/28/2022    Obesity 11/27/2018    Tobacco dependence syndrome 11/27/2018     No current outpatient medications on file.     No current facility-administered medications for this visit.     No Known Allergies    Review of Systems   Constitutional: Negative for fever, chills and malaise/fatigue.   HENT: Negative for congestion.    Eyes: Negative for pain.   Respiratory: Negative for cough and shortness of breath.    Cardiovascular: Negative for chest pain and leg swelling.   Gastrointestinal: Negative for nausea, vomiting, abdominal pain and diarrhea.   Genitourinary: Negative for dysuria and hematuria.   Skin: Negative for rash.   Neurological: Negative for dizziness, focal weakness and headaches.   Endo/Heme/Allergies: Does not bruise/bleed easily.   Psychiatric/Behavioral: Negative for depression.  The patient is not nervous/anxious.      Objective:   /68 (BP Location: Right arm, Patient Position: Sitting, BP Cuff Size: Adult)   Pulse 81   Temp 36.7 °C (98 °F) (Temporal)   Ht 1.575 m (5' 2\")   Wt 94.8 kg (209 lb)   SpO2 97%   BMI 38.23 kg/m²     Wt Readings from Last 4 Encounters:   11/03/23 94.8 kg (209 lb)   10/02/23 94.3 kg (208 lb)   09/21/23 94.3 kg " (208 lb)   09/01/23 93 kg (205 lb)       Physical Exam:  Constitutional: Well-developed and well-nourished. Not diaphoretic. No distress.   Skin: Skin is warm and dry. No rash noted. Blue nevi, circumferential, non-assymetric, located on the face.   Head: Atraumatic without lesions.  Eyes: Conjunctivae and extraocular motions are normal. Pupils are equal, round, and reactive to light. No scleral icterus.   Nose: Nares patent. Septum midline. Turbinates without erythema nor edema. No discharge.   Mouth/Throat: Tongue normal. Oropharynx is clear and moist. Posterior pharynx without erythema or exudates.  Neck: Supple, trachea midline. Normal range of motion. No thyromegaly present. No lymphadenopathy--cervical or supraclavicular.  Cardiovascular: Regular rate and rhythm, S1 and S2 without murmur, rubs, or gallops.    Respiratory: Effort normal. Clear to auscultation throughout. No adventitious sounds.   Abdomen: Soft, non tender, and without distention. Active bowel sounds in all four quadrants. No rebound, guarding, masses or HSM.  Extremities: No cyanosis, clubbing, erythema, nor edema. Distal pulses intact and symmetric.   Musculoskeletal: All major joints AROM full in all directions.   Neurological: Alert and oriented x 3. Grossly non-focal.   Psychiatric:  Behavior, mood, and affect are appropriate.    Assessment and Plan:      38 year old female presenting  for follow up on laboratory studies which have not yet been completed, as well as musculoskeletal complaints per below.  Physical exam did reveal a blue nevi on the face concerning for potential malignancy.    Plan: Refer over to dermatology for removal of blue nevi and for further evaluation.   We will plan for knee injection, will do 1 week trial of muscle relaxers for ongoing joint pain in addition to topical NSAIDs for acute elbow pain. Advised on the importance of starting PT.     1. Blue nevus of face, no changes recently, it is circumferential however  "\"popped up suddenly\"  - Referral to Dermatology    2. Due for screening  - LIPID PANEL WITH LDL/HDL RATIO  - HEMOGLOBIN A1C; Future  - HEP C VIRUS ANTIBODY; Future  - HIV AG/AB COMBO ASSAY SCREENING; Future    3. Arthralgia of right elbow  -Physical therapy  -Diclofenac gel twice daily  - 1-2 Week course of cyclobenzaprine nightly    4. Obesity (BMI 35.0-39.9 without comorbidity)    5. Nicotine dependence due to vaping tobacco product  -Improving     6. Chronic pain of left knee  -Physical therapy  -Voltaren gel twice daily  - 1-2 Week course of cyclobenzaprine nightly      Health maintenance:     Labs per orders  Patient counseled about skin care, diet, supplements, and exercise.  Discussed  diet and exercise     Follow-up: 1 mo  "

## 2023-11-06 RX ORDER — CYCLOBENZAPRINE HCL 5 MG
TABLET ORAL
Qty: 28 TABLET | Refills: 0 | Status: SHIPPED | OUTPATIENT
Start: 2023-11-06 | End: 2024-01-29 | Stop reason: SDUPTHER

## 2023-11-06 RX ORDER — DICLOFENAC SODIUM 75 MG/1
75 TABLET, DELAYED RELEASE ORAL 2 TIMES DAILY
Qty: 60 TABLET | Refills: 0 | Status: SHIPPED | OUTPATIENT
Start: 2023-11-06 | End: 2023-12-06

## 2023-11-10 ENCOUNTER — HOSPITAL ENCOUNTER (OUTPATIENT)
Dept: LAB | Facility: MEDICAL CENTER | Age: 38
End: 2023-11-10
Payer: COMMERCIAL

## 2023-11-10 DIAGNOSIS — E66.09 CLASS 2 OBESITY DUE TO EXCESS CALORIES WITHOUT SERIOUS COMORBIDITY WITH BODY MASS INDEX (BMI) OF 35.0 TO 35.9 IN ADULT: ICD-10-CM

## 2023-11-10 DIAGNOSIS — Z13.9 DUE FOR SCREENING: ICD-10-CM

## 2023-11-10 LAB
ALBUMIN SERPL BCP-MCNC: 4.3 G/DL (ref 3.2–4.9)
ALBUMIN/GLOB SERPL: 1.5 G/DL
ALP SERPL-CCNC: 68 U/L (ref 30–99)
ALT SERPL-CCNC: 17 U/L (ref 2–50)
ANION GAP SERPL CALC-SCNC: 12 MMOL/L (ref 7–16)
AST SERPL-CCNC: 12 U/L (ref 12–45)
BILIRUB SERPL-MCNC: 0.3 MG/DL (ref 0.1–1.5)
BUN SERPL-MCNC: 13 MG/DL (ref 8–22)
CALCIUM ALBUM COR SERPL-MCNC: 8.4 MG/DL (ref 8.5–10.5)
CALCIUM SERPL-MCNC: 8.6 MG/DL (ref 8.5–10.5)
CHLORIDE SERPL-SCNC: 108 MMOL/L (ref 96–112)
CHOLEST SERPL-MCNC: 136 MG/DL (ref 100–199)
CO2 SERPL-SCNC: 21 MMOL/L (ref 20–33)
CREAT SERPL-MCNC: 0.74 MG/DL (ref 0.5–1.4)
EST. AVERAGE GLUCOSE BLD GHB EST-MCNC: 117 MG/DL
FASTING STATUS PATIENT QL REPORTED: NORMAL
GFR SERPLBLD CREATININE-BSD FMLA CKD-EPI: 107 ML/MIN/1.73 M 2
GLOBULIN SER CALC-MCNC: 2.9 G/DL (ref 1.9–3.5)
GLUCOSE SERPL-MCNC: 103 MG/DL (ref 65–99)
HBA1C MFR BLD: 5.7 % (ref 4–5.6)
HCV AB SER QL: NORMAL
HDLC SERPL-MCNC: 31 MG/DL
HIV 1+2 AB+HIV1 P24 AG SERPL QL IA: NORMAL
LDLC SERPL CALC-MCNC: 82 MG/DL
POTASSIUM SERPL-SCNC: 3.9 MMOL/L (ref 3.6–5.5)
PROT SERPL-MCNC: 7.2 G/DL (ref 6–8.2)
SODIUM SERPL-SCNC: 141 MMOL/L (ref 135–145)
TRIGL SERPL-MCNC: 113 MG/DL (ref 0–149)
TSH SERPL DL<=0.005 MIU/L-ACNC: 1.06 UIU/ML (ref 0.38–5.33)

## 2023-11-10 PROCEDURE — 86803 HEPATITIS C AB TEST: CPT

## 2023-11-10 PROCEDURE — 80053 COMPREHEN METABOLIC PANEL: CPT

## 2023-11-10 PROCEDURE — 80061 LIPID PANEL: CPT

## 2023-11-10 PROCEDURE — 83036 HEMOGLOBIN GLYCOSYLATED A1C: CPT

## 2023-11-10 PROCEDURE — 36415 COLL VENOUS BLD VENIPUNCTURE: CPT

## 2023-11-10 PROCEDURE — 87389 HIV-1 AG W/HIV-1&-2 AB AG IA: CPT

## 2023-11-10 PROCEDURE — 84443 ASSAY THYROID STIM HORMONE: CPT

## 2023-11-17 ENCOUNTER — OFFICE VISIT (OUTPATIENT)
Dept: MEDICAL GROUP | Facility: CLINIC | Age: 38
End: 2023-11-17
Payer: COMMERCIAL

## 2023-11-17 VITALS
DIASTOLIC BLOOD PRESSURE: 68 MMHG | HEART RATE: 72 BPM | HEIGHT: 62 IN | BODY MASS INDEX: 37.73 KG/M2 | SYSTOLIC BLOOD PRESSURE: 122 MMHG | RESPIRATION RATE: 18 BRPM | WEIGHT: 205 LBS | OXYGEN SATURATION: 97 %

## 2023-11-17 DIAGNOSIS — M25.562 ARTHRALGIA OF BOTH KNEES: ICD-10-CM

## 2023-11-17 DIAGNOSIS — E66.9 OBESITY (BMI 30-39.9): ICD-10-CM

## 2023-11-17 DIAGNOSIS — R73.03 PREDIABETES: ICD-10-CM

## 2023-11-17 DIAGNOSIS — M25.561 ARTHRALGIA OF BOTH KNEES: ICD-10-CM

## 2023-11-17 PROCEDURE — 3078F DIAST BP <80 MM HG: CPT

## 2023-11-17 PROCEDURE — 99213 OFFICE O/P EST LOW 20 MIN: CPT | Mod: GE

## 2023-11-17 PROCEDURE — 3074F SYST BP LT 130 MM HG: CPT

## 2023-11-17 RX ORDER — SEMAGLUTIDE 0.68 MG/ML
0.5 INJECTION, SOLUTION SUBCUTANEOUS
Qty: 3 ML | Refills: 11 | Status: SHIPPED | OUTPATIENT
Start: 2023-11-17 | End: 2023-12-19 | Stop reason: SDUPTHER

## 2023-11-17 ASSESSMENT — FIBROSIS 4 INDEX: FIB4 SCORE: 0.36

## 2023-11-17 NOTE — PROGRESS NOTES
Subjective:     CC:   Chief Complaint   Patient presents with    Lab review        HPI:   Hannah Winters is a 38 y.o. female who presents to the clinic today for evaluation of laboratory studies.  Recent labs have revealed new onset prediabetes with an A1c of 5.7.  CMP within normal limits.  HIV and hep C screening negative.  Lipid profile was reassuring.    We discussed patient's weight gain, current BMI of 37.49 along with elevated sugars with prediabetes.  Discussed potential pharmacotherapies patient is currently working 2 full-time jobs and unfortunately has no time to exercise or eat healthy.    Patient states her knee pain has worsened since she gained weight, she would like lifestyle modifications prior to knee injections.  We did discuss ways to help patient lose weight.  Lifestyle recommendations provided during this visit.  In addition, patient okay with trying more noninvasive therapies for her joint pain at this time including Voltaren/topical gels.      OB History    Para Term  AB Living   3 2 1 1 1 2   SAB IAB Ectopic Molar Multiple Live Births   0 0 0 0 0 0      She  reports that she is not currently sexually active. She reports using the following method of birth control/protection: Implant.    She  has no past medical history on file.  She  has a past surgical history that includes primary c section (2006) and primary c section (2018).    Family History   Problem Relation Age of Onset    Breast Cancer Mother     Diabetes Father     No Known Problems Brother     No Known Problems Brother     No Known Problems Maternal Grandmother     Cancer Maternal Grandfather     No Known Problems Paternal Grandmother     No Known Problems Paternal Grandfather     No Known Problems Daughter     No Known Problems Son      Social History     Tobacco Use    Smoking status: Some Days     Current packs/day: 0.50     Average packs/day: 0.5 packs/day for 6.0 years (3.0 ttl pk-yrs)     Types:  "Cigarettes    Smokeless tobacco: Never    Tobacco comments:     Pt sts 1 every once and awhile 11/28/22   Vaping Use    Vaping Use: Every day    Substances: Nicotine, Flavoring    Devices: Disposable, Refillable tank   Substance Use Topics    Alcohol use: Not Currently    Drug use: No       Patient Active Problem List    Diagnosis Date Noted    Moderate episode of recurrent major depressive disorder (HCC) 05/23/2023    Vaginal discharge 11/28/2022    Chronic pain of left knee 09/22/2022    Nexplanon in place 07/28/2022    Neck swelling 07/28/2022    Obesity 11/27/2018    Tobacco dependence syndrome 11/27/2018     Current Outpatient Medications   Medication Sig Dispense Refill    cyclobenzaprine (FLEXERIL) 5 mg tablet TAKE 1 TABLET BY MOUTH TWICE DAILY AS NEEDED FOR MUSCLE SPASM OR  MODERATE  PAIN  FOR  UP  TO  14  DAYS 28 Tablet 0    diclofenac DR (VOLTAREN) 75 MG Tablet Delayed Response Take 1 Tablet by mouth 2 times a day for 30 days. 60 Tablet 0     No current facility-administered medications for this visit.     No Known Allergies    Review of Systems   Constitutional: Negative for fever, chills and malaise/fatigue.   HENT: Negative for congestion.    Eyes: Negative for pain.   Respiratory: Negative for cough and shortness of breath.    Cardiovascular: Negative for chest pain and leg swelling.   Gastrointestinal: Negative for nausea, vomiting, abdominal pain and diarrhea.   Genitourinary: Negative for dysuria and hematuria.   Skin: Negative for rash.   Neurological: Negative for dizziness, focal weakness and headaches.   Endo/Heme/Allergies: Does not bruise/bleed easily.   Psychiatric/Behavioral: Negative for depression.  The patient is not nervous/anxious.      Objective:   /68 (BP Location: Left arm, Patient Position: Sitting, BP Cuff Size: Adult)   Pulse 72   Resp 18   Ht 1.575 m (5' 2\")   Wt 93 kg (205 lb)   SpO2 97%   BMI 37.49 kg/m²     Wt Readings from Last 4 Encounters:   11/03/23 94.8 kg " (209 lb)   10/02/23 94.3 kg (208 lb)   09/21/23 94.3 kg (208 lb)   09/01/23 93 kg (205 lb)       Physical Exam:  Constitutional: Well-developed and well-nourished. Not diaphoretic. No distress.   Skin: Skin is warm and dry. No rash noted.  Blue nevi present on the nose.  Head: Atraumatic without lesions.  Eyes: Conjunctivae and extraocular motions are normal. Pupils are equal, round, and reactive to light. No scleral icterus.   Nose: Nares patent. Septum midline. Turbinates without erythema nor edema. No discharge.   Mouth/Throat: Tongue normal. Oropharynx is clear and moist. Posterior pharynx without erythema or exudates.  Neck: Supple, trachea midline. Normal range of motion.   Cardiovascular: Regular rate and rhythm, S1 and S2 without murmur, rubs, or gallops.    Respiratory: Effort normal. Clear to auscultation throughout. No adventitious sounds.   Abdomen: Soft, non tender, and without distention. Active bowel sounds in all four quadrants. No rebound, guarding, masses or HSM.  Extremities: No cyanosis, clubbing, erythema, nor edema. Distal pulses intact and symmetric.   Musculoskeletal: All major joints AROM full in all directions without pain.  Neurological: Alert and oriented x 3. Grossly non-focal. Strength and sensation grossly intact.   Psychiatric:  Behavior, mood, and affect are appropriate.    Assessment and Plan:     38 year old who presents to clinic today with newly diagnosed prediabetes in association with obesity with a BMI of 37.4.    1. Prediabetes  2. Obesity (BMI 30-39.9)  We discussed lifestyle modifications, prediabetes we will trial Ozempic, patient would be a great candidate for Victoza, we discussed alternative options and resources.  Patient would also be a great candidate for phentermine.  Patient is aware of cost and willing to pay a modest out-of-pocket expense for Ozempic at this time given the benefits for both newly diagnosed prediabetes and obesity.    - Semaglutide,0.25 or  0.5MG/DOS, (OZEMPIC, 0.25 OR 0.5 MG/DOSE,) 2 MG/3ML Solution Pen-injector; Inject 0.5 mg under the skin every 7 days.  Dispense: 3 mL; Refill: 11  - diclofenac sodium (VOLTAREN) 1 % Gel; Apply 4 g topically 4 times a day as needed (knee pain) for up to 14 days.  Dispense: 50 g; Refill: 3    3. Joint pain  Voltaren gel topically sent to patient's pharmacy, advised and counseled on application benefits and risks  Would benefit greatly from weight loss, MRI images have been completed, may benefit from weightbearing Mohan views should weight loss and physical therapy not relieve symptoms    Health maintenance:     Labs per orders  Immunizations per orders  Patient counseled about skin care, diet, supplements, and exercise.  Discussed  diet and exercise     Follow-up: No follow-ups on file.

## 2023-11-28 ENCOUNTER — TELEPHONE (OUTPATIENT)
Dept: MEDICAL GROUP | Facility: CLINIC | Age: 38
End: 2023-11-28
Payer: COMMERCIAL

## 2023-12-19 ENCOUNTER — OFFICE VISIT (OUTPATIENT)
Dept: MEDICAL GROUP | Facility: CLINIC | Age: 38
End: 2023-12-19
Payer: COMMERCIAL

## 2023-12-19 VITALS
TEMPERATURE: 99 F | BODY MASS INDEX: 36.99 KG/M2 | HEART RATE: 82 BPM | HEIGHT: 62 IN | SYSTOLIC BLOOD PRESSURE: 116 MMHG | DIASTOLIC BLOOD PRESSURE: 72 MMHG | OXYGEN SATURATION: 95 % | WEIGHT: 201 LBS

## 2023-12-19 DIAGNOSIS — M25.561 ACUTE PAIN OF RIGHT KNEE: ICD-10-CM

## 2023-12-19 DIAGNOSIS — M25.562 CHRONIC PAIN OF LEFT KNEE: ICD-10-CM

## 2023-12-19 DIAGNOSIS — E66.9 OBESITY (BMI 30-39.9): ICD-10-CM

## 2023-12-19 DIAGNOSIS — G89.29 CHRONIC PAIN OF LEFT KNEE: ICD-10-CM

## 2023-12-19 DIAGNOSIS — R73.03 PREDIABETES: ICD-10-CM

## 2023-12-19 PROCEDURE — 3074F SYST BP LT 130 MM HG: CPT

## 2023-12-19 PROCEDURE — 3078F DIAST BP <80 MM HG: CPT

## 2023-12-19 PROCEDURE — 99213 OFFICE O/P EST LOW 20 MIN: CPT | Mod: GE

## 2023-12-19 RX ORDER — SEMAGLUTIDE 0.68 MG/ML
0.5 INJECTION, SOLUTION SUBCUTANEOUS
Qty: 3 ML | Refills: 11 | Status: SHIPPED | OUTPATIENT
Start: 2023-12-19 | End: 2024-03-01

## 2023-12-19 ASSESSMENT — FIBROSIS 4 INDEX: FIB4 SCORE: 0.36

## 2023-12-19 NOTE — PROGRESS NOTES
Subjective:     CC:   Chief Complaint   Patient presents with    Follow-Up     1 month follow up       HPI:   Hannah Winters is a 38 y.o. female who presents for a follow-up visit.  Patient has presented with left knee pain in the past found to have a popliteal cyst on her left knee.  Her pain is worsened since since she started to gain weight.  She has had difficulties in losing weight due to recent job change and ability to follow through with exercise regimen and diet regimen.  She is currently working evening shifts and finding it difficult to maintain and healthy diet.  Her recent laboratory studies which were discussed during her last visit did reveal prediabetes.  Her previous insurance did deny her Ozempic medication for the treatment of both obesity and prediabetes.  Discussed the risks of prediabetes on health.  Today patient presents with a right acute sided knee pain.  She denies any recent trauma.  The patient did have x-rays performed the past she has not had any weightbearing x-rays to date.  She denies any fevers or chills or any other acute symptoms at this time.        OB History    Para Term  AB Living   3 2 1 1 1 2   SAB IAB Ectopic Molar Multiple Live Births   0 0 0 0 0 0      She  reports that she is not currently sexually active. She reports using the following method of birth control/protection: Implant.    She  has no past medical history on file.  She  has a past surgical history that includes primary c section (2006) and primary c section (2018).    Family History   Problem Relation Age of Onset    Breast Cancer Mother     Diabetes Father     No Known Problems Brother     No Known Problems Brother     No Known Problems Maternal Grandmother     Cancer Maternal Grandfather     No Known Problems Paternal Grandmother     No Known Problems Paternal Grandfather     No Known Problems Daughter     No Known Problems Son      Social History     Tobacco Use    Smoking  status: Some Days     Current packs/day: 0.50     Average packs/day: 0.5 packs/day for 6.0 years (3.0 ttl pk-yrs)     Types: Cigarettes    Smokeless tobacco: Never    Tobacco comments:     Pt sts 1 every once and awhile 11/28/22   Vaping Use    Vaping Use: Every day    Substances: Nicotine, Flavoring    Devices: Disposable, Refillable tank   Substance Use Topics    Alcohol use: Not Currently    Drug use: No       Patient Active Problem List    Diagnosis Date Noted    Moderate episode of recurrent major depressive disorder (HCC) 05/23/2023    Vaginal discharge 11/28/2022    Chronic pain of left knee 09/22/2022    Nexplanon in place 07/28/2022    Neck swelling 07/28/2022    Obesity 11/27/2018    Tobacco dependence syndrome 11/27/2018     Current Outpatient Medications   Medication Sig Dispense Refill    cyclobenzaprine (FLEXERIL) 5 mg tablet TAKE 1 TABLET BY MOUTH TWICE DAILY AS NEEDED FOR MUSCLE SPASM OR  MODERATE  PAIN  FOR  UP  TO  14  DAYS 28 Tablet 0    Semaglutide,0.25 or 0.5MG/DOS, (OZEMPIC, 0.25 OR 0.5 MG/DOSE,) 2 MG/3ML Solution Pen-injector Inject 0.5 mg under the skin every 7 days. (Patient not taking: Reported on 12/19/2023) 3 mL 11     No current facility-administered medications for this visit.     No Known Allergies    Review of Systems   Constitutional: Negative for fever, chills and malaise/fatigue.   HENT: Negative for congestion.    Eyes: Negative for pain.   Respiratory: Negative for cough and shortness of breath.    Cardiovascular: Negative for chest pain and leg swelling.   Gastrointestinal: Negative for nausea, vomiting, abdominal pain and diarrhea.   Genitourinary: Negative for dysuria and hematuria.   Skin: Negative for rash.   Neurological: Negative for dizziness, focal weakness and headaches.   Endo/Heme/Allergies: Does not bruise/bleed easily.   Psychiatric/Behavioral: Negative for depression.  The patient is not nervous/anxious.      Objective:   /72 (BP Location: Left arm, Patient  "Position: Sitting, BP Cuff Size: Large adult)   Pulse 82   Temp 37.2 °C (99 °F) (Temporal)   Ht 1.575 m (5' 2\")   Wt 91.2 kg (201 lb)   SpO2 95%   BMI 36.76 kg/m²     Wt Readings from Last 4 Encounters:   12/19/23 91.2 kg (201 lb)   11/17/23 93 kg (205 lb)   11/03/23 94.8 kg (209 lb)   10/02/23 94.3 kg (208 lb)       Physical Exam:  Constitutional: Well-developed and well-nourished. Not diaphoretic. No distress.   Skin: Skin is warm and dry.  Blue nevi, unchanged.   Head: Atraumatic without lesions.  Eyes: No scleral icterus.   Ears:  External ears unremarkable.   Nose: Nares patent. Septum midline. Turbinates without erythema nor edema. No discharge.   Mouth/Throat: Tongue normal. Oropharynx is clear and moist. Posterior pharynx without erythema or exudates.  Neck: Supple, trachea midline. Normal range of motion.   Cardiovascular: Regular rate and rhythm, S1 and S2 without murmur, rubs, or gallops.    Respiratory: Effort normal. Clear to auscultation throughout. No adventitious sounds.   Abdomen: Soft, non tender, and without distention. Active bowel sounds in all four quadrants. No rebound, guarding, masses or HSM.  Extremities: No cyanosis, clubbing, erythema, nor edema. Distal pulses intact and symmetric.   Musculoskeletal: All major joints AROM full in all directions without pain.  Neurological: Alert and oriented x 3. Grossly non-focal. Strength and sensation grossly intact.   Psychiatric:  Behavior, mood, and affect are appropriate.    Assessment and Plan:   38-year-old female here for evaluation of bilateral knee pain, unresponsive to topical and systemic NSAIDs i.e. ibuprofen as well as Voltaren gel.  Exacerbated by recent weight gain.    Will benefit from weightbearing Mohan views of bilateral knees.  These imaging studies were ordered today.  Continue to discuss weight loss regimen.    1. Obesity (BMI 30-39.9)    2. Prediabetes    3. Acute pain of right knee  - DX-KNEE 3 VIEWS Pain/Deformity " Following Trauma; Future    4. Chronic pain of left knee  - DX-KNEE 3 VIEWS Pain/Deformity Following Trauma; Future    Patient counseled about skin care, diet, supplements, and exercise.  Discussed  adequate intake of calcium and vitamin D, diet and exercise.     Follow-up: 2 months, recommended lifestyle medicine

## 2023-12-21 ENCOUNTER — HOSPITAL ENCOUNTER (OUTPATIENT)
Dept: RADIOLOGY | Facility: MEDICAL CENTER | Age: 38
End: 2023-12-21
Payer: COMMERCIAL

## 2023-12-21 DIAGNOSIS — G89.29 CHRONIC PAIN OF LEFT KNEE: ICD-10-CM

## 2023-12-21 DIAGNOSIS — M25.562 CHRONIC PAIN OF LEFT KNEE: ICD-10-CM

## 2023-12-21 DIAGNOSIS — M25.561 ACUTE PAIN OF RIGHT KNEE: ICD-10-CM

## 2023-12-21 PROCEDURE — 73562 X-RAY EXAM OF KNEE 3: CPT | Mod: RT

## 2024-01-30 NOTE — TELEPHONE ENCOUNTER
Received request via: Pharmacy    Was the patient seen in the last year in this department? Yes    Does the patient have an active prescription (recently filled or refills available) for medication(s) requested? No    Pharmacy Name: [facility] [place]    Does the patient have prison Plus and need 100 day supply (blood pressure, diabetes and cholesterol meds only)? Patient does not have SCP

## 2024-02-02 RX ORDER — CYCLOBENZAPRINE HCL 5 MG
TABLET ORAL
Qty: 28 TABLET | Refills: 0 | Status: SHIPPED | OUTPATIENT
Start: 2024-02-02 | End: 2024-03-01

## 2024-03-01 ENCOUNTER — OFFICE VISIT (OUTPATIENT)
Dept: MEDICAL GROUP | Facility: CLINIC | Age: 39
End: 2024-03-01
Payer: COMMERCIAL

## 2024-03-01 VITALS
OXYGEN SATURATION: 93 % | BODY MASS INDEX: 32.76 KG/M2 | DIASTOLIC BLOOD PRESSURE: 78 MMHG | RESPIRATION RATE: 16 BRPM | HEART RATE: 93 BPM | SYSTOLIC BLOOD PRESSURE: 146 MMHG | WEIGHT: 178 LBS | HEIGHT: 62 IN

## 2024-03-01 DIAGNOSIS — M25.562 CHRONIC PAIN OF LEFT KNEE: ICD-10-CM

## 2024-03-01 DIAGNOSIS — G89.29 CHRONIC PAIN OF LEFT KNEE: ICD-10-CM

## 2024-03-01 PROCEDURE — 3077F SYST BP >= 140 MM HG: CPT | Mod: GC

## 2024-03-01 PROCEDURE — 99213 OFFICE O/P EST LOW 20 MIN: CPT | Mod: GC

## 2024-03-01 PROCEDURE — 3078F DIAST BP <80 MM HG: CPT | Mod: GC

## 2024-03-01 RX ORDER — CYCLOBENZAPRINE HCL 5 MG
10 TABLET ORAL
Qty: 30 TABLET | Refills: 0 | Status: SHIPPED | OUTPATIENT
Start: 2024-03-01 | End: 2024-03-31

## 2024-03-01 ASSESSMENT — FIBROSIS 4 INDEX: FIB4 SCORE: 0.36

## 2024-03-01 NOTE — PROGRESS NOTES
Subjective:     CC:   Chief Complaint   Patient presents with    Knee Pain     Left knee pain- sharp pain.        HPI:   Hannah Winters is a 38 y.o. female for follow-up of left knee pain.  Patient has lost some weight however states her pain continues.  She is status post imaging which has been largely reassuring.  Have sent referral in for physical therapy and lifestyle interventions for weight loss however patient has been unable to do this due to significant work schedule.  She states the Flexeril is working however she requires 10 mg of Flexeril to obtain relief.  She is also in addition to taking significant past Tylenol for her knee pain given that she is constantly lifting and moving and bending her knee for work.  Status post x-ray as well as MRI of the knee.      OB History    Para Term  AB Living   3 2 1 1 1 2   SAB IAB Ectopic Molar Multiple Live Births   0 0 0 0 0 0      She  reports that she is not currently sexually active. She reports using the following method of birth control/protection: Implant.    She  has no past medical history on file.  She  has a past surgical history that includes primary c section (2006) and primary c section (2018).    Family History   Problem Relation Age of Onset    Breast Cancer Mother     Diabetes Father     No Known Problems Brother     No Known Problems Brother     No Known Problems Maternal Grandmother     Cancer Maternal Grandfather     No Known Problems Paternal Grandmother     No Known Problems Paternal Grandfather     No Known Problems Daughter     No Known Problems Son      Social History     Tobacco Use    Smoking status: Some Days     Current packs/day: 0.50     Average packs/day: 0.5 packs/day for 6.0 years (3.0 ttl pk-yrs)     Types: Cigarettes    Smokeless tobacco: Never    Tobacco comments:     Pt sts 1 every once and awhile 22   Vaping Use    Vaping Use: Every day    Substances: Nicotine, Flavoring    Devices: Disposable,  "BakedCode   Substance Use Topics    Alcohol use: Not Currently    Drug use: No       Patient Active Problem List    Diagnosis Date Noted    Moderate episode of recurrent major depressive disorder (HCC) 05/23/2023    Vaginal discharge 11/28/2022    Chronic pain of left knee 09/22/2022    Nexplanon in place 07/28/2022    Neck swelling 07/28/2022    Obesity 11/27/2018    Tobacco dependence syndrome 11/27/2018     Current Outpatient Medications   Medication Sig Dispense Refill    cyclobenzaprine (FLEXERIL) 5 mg tablet TAKE 1 TABLET BY MOUTH TWICE DAILY AS NEEDED FOR MUSCLE SPASM OR  MODERATE  PAIN  FOR  UP  TO  14  DAYS 28 Tablet 0     No current facility-administered medications for this visit.     No Known Allergies    Review of Systems   Constitutional: Negative for fever, chills and malaise/fatigue.   HENT: Negative for congestion.    Eyes: Negative for pain.   Respiratory: Negative for cough and shortness of breath.    Cardiovascular: Negative for chest pain and leg swelling.   Gastrointestinal: Negative for nausea, vomiting, abdominal pain and diarrhea.   Genitourinary: Negative for dysuria and hematuria.   Skin: Negative for rash.   Neurological: Negative for dizziness, focal weakness and headaches.   Endo/Heme/Allergies: Does not bruise/bleed easily.   Psychiatric/Behavioral: Negative for depression.  The patient is not nervous/anxious.      Objective:   BP (!) 146/78 (BP Location: Left arm, Patient Position: Sitting, BP Cuff Size: Adult)   Pulse 93   Resp 16   Ht 1.575 m (5' 2\")   Wt 80.7 kg (178 lb)   SpO2 93%   BMI 32.56 kg/m²     Wt Readings from Last 4 Encounters:   12/19/23 91.2 kg (201 lb)   11/17/23 93 kg (205 lb)   11/03/23 94.8 kg (209 lb)   10/02/23 94.3 kg (208 lb)         Physical Exam:  Constitutional: Well-developed and well-nourished. Not diaphoretic. No distress.   Skin: Skin is warm and dry. No rash noted.  Head: Atraumatic without lesions.  Eyes:  No scleral icterus.   Ears:  " External ears unremarkable.   Nose: Nares patent. Septum midline. Turbinates without erythema nor edema. No discharge.   Mouth/Throat: Tongue normal. Oropharynx is clear and moist. Posterior pharynx without erythema or exudates.  Neck: Supple, trachea midline. Normal range of motion.   Cardiovascular: Regular rate and rhythm, S1 and S2 without murmur, rubs, or gallops.    Respiratory: Effort normal. Clear to auscultation throughout. No adventitious sounds.   Extremities: No cyanosis, clubbing, erythema, nor edema. Distal pulses intact and symmetric.   Musculoskeletal: Positive for left-sided knee pain.  Neurological: Alert and oriented x 3. Grossly non-focal. Strength and sensation grossly intact.   Psychiatric:  Behavior, mood, and affect are appropriate.    Assessment and Plan:     38-year-old female with recurrent left-sided knee pain status post imaging, with x-ray revealing bony alignment and mineralization within normal limits and no appreciable narrowing or osteophytic changes of the medial compartment lateral or patellofemoral compartments, no suprapatellar knee joint effusion.    -Continue to advise patient to lose weight and do physical therapy, her job does require her to do squatting and significant knee range of motion stat require her to take significant amounts of Tylenol.  States Flexeril 10 mg does help.  Advised that she will need to start physical therapy.  Patient verbalized understanding.  She is due for a Pap.  Advised to schedule her for Pap for next visit.    1. Chronic pain of left knee  - Referral to Physical Therapy    Other orders  - cyclobenzaprine (FLEXERIL) 5 mg tablet; Take 2 Tablets by mouth 1 time a day as needed for Muscle Spasms for up to 30 days.  Dispense: 30 Tablet; Refill: 0    Health maintenance:     Labs per orders  Immunizations per orders  Patient counseled about skin care, diet, supplements, and exercise.  Discussed  STD prevention, HIV risk factors and prevention,  feminine hygiene, family planning choices, adequate intake of calcium and vitamin D, diet and exercise, Kegel exercises     Follow-up: Needs to return to clinic for Pap.

## 2024-03-08 ENCOUNTER — HOSPITAL ENCOUNTER (OUTPATIENT)
Facility: MEDICAL CENTER | Age: 39
End: 2024-03-08
Payer: COMMERCIAL

## 2024-03-08 ENCOUNTER — OFFICE VISIT (OUTPATIENT)
Dept: MEDICAL GROUP | Facility: CLINIC | Age: 39
End: 2024-03-08
Payer: COMMERCIAL

## 2024-03-08 VITALS
BODY MASS INDEX: 33.13 KG/M2 | DIASTOLIC BLOOD PRESSURE: 85 MMHG | SYSTOLIC BLOOD PRESSURE: 144 MMHG | TEMPERATURE: 97.7 F | HEART RATE: 96 BPM | RESPIRATION RATE: 16 BRPM | OXYGEN SATURATION: 95 % | HEIGHT: 62 IN | WEIGHT: 180 LBS

## 2024-03-08 DIAGNOSIS — Z12.4 PAP SMEAR FOR CERVICAL CANCER SCREENING: ICD-10-CM

## 2024-03-08 DIAGNOSIS — N89.8 VAGINAL DISCHARGE: ICD-10-CM

## 2024-03-08 PROCEDURE — 99385 PREV VISIT NEW AGE 18-39: CPT | Mod: GC

## 2024-03-08 PROCEDURE — 88175 CYTOPATH C/V AUTO FLUID REDO: CPT

## 2024-03-08 PROCEDURE — 3077F SYST BP >= 140 MM HG: CPT | Mod: GC

## 2024-03-08 PROCEDURE — 87660 TRICHOMONAS VAGIN DIR PROBE: CPT

## 2024-03-08 PROCEDURE — 87591 N.GONORRHOEAE DNA AMP PROB: CPT

## 2024-03-08 PROCEDURE — 87510 GARDNER VAG DNA DIR PROBE: CPT

## 2024-03-08 PROCEDURE — 3079F DIAST BP 80-89 MM HG: CPT | Mod: GC

## 2024-03-08 PROCEDURE — 87491 CHLMYD TRACH DNA AMP PROBE: CPT

## 2024-03-08 PROCEDURE — 87624 HPV HI-RISK TYP POOLED RSLT: CPT

## 2024-03-08 PROCEDURE — 87480 CANDIDA DNA DIR PROBE: CPT

## 2024-03-08 RX ORDER — METRONIDAZOLE 500 MG/1
500 TABLET ORAL
Qty: 12 TABLET | Refills: 0 | Status: SHIPPED | OUTPATIENT
Start: 2024-03-08 | End: 2024-03-14

## 2024-03-08 RX ORDER — FLUCONAZOLE 100 MG/1
200 TABLET ORAL DAILY
Qty: 1 TABLET | Refills: 0 | Status: SHIPPED | OUTPATIENT
Start: 2024-03-08 | End: 2024-03-09

## 2024-03-08 ASSESSMENT — FIBROSIS 4 INDEX: FIB4 SCORE: 0.36

## 2024-03-08 ASSESSMENT — PATIENT HEALTH QUESTIONNAIRE - PHQ9: CLINICAL INTERPRETATION OF PHQ2 SCORE: 0

## 2024-03-08 NOTE — LETTER
March 12, 2024    To Whom It May Concern:         This is confirmation that Hannah Winters attended her scheduled appointment with Lorin Elkins MD PhD on 3/08/24. Based on physical exam findings, I recommend her duties to remain physically light while physical therapy is pursued, including limiting weight lifting to no more than 15 pounds and no continued standing for more than 3 hours at a time.          If you have any questions please do not hesitate to call me at the phone number listed below.    Sincerely,      Lorin Elkins M.D.PhD   [phone]

## 2024-03-08 NOTE — PROGRESS NOTES
Subjective:     CC:   Chief Complaint   Patient presents with    Gynecologic Exam       HPI:   Hannah Winters is a 38 y.o. female who presents for Pap for cervical cancer screening.  She does endorse symptoms of thick vaginal discharge ongoing now for several days.  She denies being sexually active and denies any new sexual partners at this time.  She has no other concerns at this time.  We discussed the importance of screening for sexually transmitted diseases at this time such as chlamydia /gonorrhea and trichomoniasis.  Patient agreeable to obtain samples for this testing as well.      OB History    Para Term  AB Living   3 2 1 1 1 2   SAB IAB Ectopic Molar Multiple Live Births   0 0 0 0 0 0      She  reports that she is not currently sexually active. She reports using the following method of birth control/protection: Implant.    She  has no past medical history on file.  She  has a past surgical history that includes primary c section (2006) and primary c section (2018).    Family History   Problem Relation Age of Onset    Breast Cancer Mother     Diabetes Father     No Known Problems Brother     No Known Problems Brother     No Known Problems Maternal Grandmother     Cancer Maternal Grandfather     No Known Problems Paternal Grandmother     No Known Problems Paternal Grandfather     No Known Problems Daughter     No Known Problems Son      Social History     Tobacco Use    Smoking status: Former     Current packs/day: 0.50     Average packs/day: 0.5 packs/day for 6.0 years (3.0 ttl pk-yrs)     Types: Cigarettes    Smokeless tobacco: Never    Tobacco comments:     Pt sts 1 every once and awhile 22   Vaping Use    Vaping Use: Every day    Substances: Nicotine, Flavoring    Devices: Disposable, Refillable tank   Substance Use Topics    Alcohol use: Not Currently    Drug use: Never       Patient Active Problem List    Diagnosis Date Noted    Moderate episode of recurrent major  "depressive disorder (HCC) 05/23/2023    Vaginal discharge 11/28/2022    Chronic pain of left knee 09/22/2022    Nexplanon in place 07/28/2022    Neck swelling 07/28/2022    Obesity 11/27/2018    Tobacco dependence syndrome 11/27/2018     Current Outpatient Medications   Medication Sig Dispense Refill    cyclobenzaprine (FLEXERIL) 5 mg tablet Take 2 Tablets by mouth 1 time a day as needed for Muscle Spasms for up to 30 days. 30 Tablet 0     No current facility-administered medications for this visit.     No Known Allergies    Review of Systems   Constitutional: Negative for fever, chills and malaise/fatigue.   HENT: Negative for congestion.    Eyes: Negative for pain.   Respiratory: Negative for cough and shortness of breath.    Cardiovascular: Negative for chest pain and leg swelling.   Gastrointestinal: Negative for nausea, vomiting, abdominal pain and diarrhea.   Genitourinary: Negative for dysuria and hematuria.  Positive for malodorous frothy thick white vaginal discharge.  Skin: Negative for rash.   Neurological: Negative for dizziness, focal weakness and headaches.   Endo/Heme/Allergies: Does not bruise/bleed easily.   Psychiatric/Behavioral: Negative for depression.  The patient is not nervous/anxious.      Objective:   BP (!) 144/85 (BP Location: Left arm, Patient Position: Sitting, BP Cuff Size: Adult)   Pulse 96   Temp 36.5 °C (97.7 °F) (Temporal)   Resp 16   Ht 1.575 m (5' 2\")   Wt 81.6 kg (180 lb)   SpO2 95%   BMI 32.92 kg/m²     Wt Readings from Last 4 Encounters:   03/08/24 81.6 kg (180 lb)   03/01/24 80.7 kg (178 lb)   12/19/23 91.2 kg (201 lb)   11/17/23 93 kg (205 lb)       A chaperone was offered to the patient during today's exam. Chaperone name: GRICEL Graham was present.    Physical Exam:  Constitutional: Well-developed and well-nourished. Not diaphoretic. No distress.   Skin: Skin is warm and dry. No rash noted.  Head: Atraumatic without lesions.  Eyes: Conjunctivae and extraocular motions " are normal. Pupils are equal, round, and reactive to light. No scleral icterus.   Nose: Nares patent. Septum midline. Turbinates without erythema nor edema. No discharge.   Neck: Supple, trachea midline. Normal range of motion.   Respiratory: Effort normal.  : Perineum and external genitalia normal without rash. Vagina with copious, white, thick, and malodorous discharge. Cervix without visible lesions or discharge. Bimanual exam without adnexal masses or cervical motion tenderness.  Neurological: Alert and oriented x 3. Grossly non-focal.   Psychiatric:  Behavior, mood, and affect are appropriate.    Assessment and Plan:     38-year-old female who presents for routine Pap smear for cervical cancer screening with malodorous copious white thick vaginal discharge ongoing now for several days.  Physiologic findings appear consistent with either bacterial vaginosis or Candida, will treat prophylactically for both while we obtain results.    1. Pap smear for cervical cancer screening  - VAGINAL PATHOGENS DNA PANEL; Future  - PAP LB, CT-NG, HPV 16&18    2. Vaginal discharge    Other orders  - metroNIDAZOLE (FLAGYL) 500 MG Tab; Take 1 Tablet by mouth 2 times a day for 6 days.  Dispense: 12 Tablet; Refill: 0  - fluconazole (DIFLUCAN) 100 MG Tab; Take 2 Tablets by mouth every day for 1 day.  Dispense: 1 Tablet; Refill: 0      Health maintenance:     Labs per orders  Immunizations per orders  Patient counseled about skin care, diet, supplements, and exercise.  Discussed  STD prevention, HIV risk factors and prevention, feminine hygiene, use and side effects of HRT, family planning choices, adequate intake of calcium and vitamin D, diet and exercise     Follow-up: 8 weeks

## 2024-03-17 LAB
C TRACH RRNA CVX QL NAA+PROBE: NEGATIVE
CYTOLOGIST CVX/VAG CYTO: NORMAL
CYTOLOGY CVX/VAG DOC CYTO: NORMAL
CYTOLOGY CVX/VAG DOC THIN PREP: NORMAL
HPV I/H RISK 4 DNA CVX QL PROBE+SIG AMP: NEGATIVE
N GONORRHOEA RRNA CVX QL NAA+PROBE: NEGATIVE
NOTE NL11727A: NORMAL
OTHER STN SPEC: NORMAL
STAT OF ADQ CVX/VAG CYTO-IMP: NORMAL

## 2024-05-10 ENCOUNTER — OFFICE VISIT (OUTPATIENT)
Dept: MEDICAL GROUP | Facility: CLINIC | Age: 39
End: 2024-05-10
Payer: COMMERCIAL

## 2024-05-10 VITALS
BODY MASS INDEX: 33.31 KG/M2 | WEIGHT: 181 LBS | HEIGHT: 62 IN | HEART RATE: 74 BPM | SYSTOLIC BLOOD PRESSURE: 118 MMHG | OXYGEN SATURATION: 97 % | TEMPERATURE: 98.2 F | DIASTOLIC BLOOD PRESSURE: 76 MMHG

## 2024-05-10 DIAGNOSIS — B96.89 BACTERIAL VAGINOSIS: ICD-10-CM

## 2024-05-10 DIAGNOSIS — N76.0 BACTERIAL VAGINOSIS: ICD-10-CM

## 2024-05-10 PROCEDURE — 99213 OFFICE O/P EST LOW 20 MIN: CPT | Mod: GE

## 2024-05-10 ASSESSMENT — PATIENT HEALTH QUESTIONNAIRE - PHQ9
3. TROUBLE FALLING OR STAYING ASLEEP OR SLEEPING TOO MUCH: SEVERAL DAYS
9. THOUGHTS THAT YOU WOULD BE BETTER OFF DEAD, OR OF HURTING YOURSELF: NOT AT ALL
SUM OF ALL RESPONSES TO PHQ9 QUESTIONS 1 AND 2: 0
5. POOR APPETITE OR OVEREATING: NOT AT ALL
2. FEELING DOWN, DEPRESSED, IRRITABLE, OR HOPELESS: NOT AT ALL
1. LITTLE INTEREST OR PLEASURE IN DOING THINGS: NOT AT ALL
SUM OF ALL RESPONSES TO PHQ QUESTIONS 1-9: 2
8. MOVING OR SPEAKING SO SLOWLY THAT OTHER PEOPLE COULD HAVE NOTICED. OR THE OPPOSITE, BEING SO FIGETY OR RESTLESS THAT YOU HAVE BEEN MOVING AROUND A LOT MORE THAN USUAL: NOT AT ALL
7. TROUBLE CONCENTRATING ON THINGS, SUCH AS READING THE NEWSPAPER OR WATCHING TELEVISION: NOT AT ALL
6. FEELING BAD ABOUT YOURSELF - OR THAT YOU ARE A FAILURE OR HAVE LET YOURSELF OR YOUR FAMILY DOWN: NOT AL ALL
4. FEELING TIRED OR HAVING LITTLE ENERGY: SEVERAL DAYS

## 2024-05-10 ASSESSMENT — FIBROSIS 4 INDEX: FIB4 SCORE: 0.36

## 2024-05-10 NOTE — PROGRESS NOTES
Subjective:     CC:   Chief Complaint   Patient presents with    [facility]     Knee f/u, pap smear results       HPI:   Hannah Winters is a 38 y.o. female who presents for follow-up of Pap results.  Pap results were negative for intraepithelial malignancy at this time.  Patient is also asymptomatic.  Her vaginal swab did test positive for bacterial vaginosis for which we have already treated patient.  She presents today for follow-up states that she is asymptomatic and no longer having any vaginal discharge.  Unfortunately patient recently was laid off from work, although this has been a good thing for her knee as the decrease activity has decreased her knee pain.  She is doing well otherwise and has no acute concerns.    OB History    Para Term  AB Living   3 2 1 1 1 2   SAB IAB Ectopic Molar Multiple Live Births   0 0 0 0 0 0      She  reports that she is not currently sexually active. She reports using the following method of birth control/protection: Implant.    She  has no past medical history on file.  She  has a past surgical history that includes primary c section (2006) and primary c section (2018).    Family History   Problem Relation Age of Onset    Breast Cancer Mother     Diabetes Father     No Known Problems Brother     No Known Problems Brother     No Known Problems Maternal Grandmother     Cancer Maternal Grandfather     No Known Problems Paternal Grandmother     No Known Problems Paternal Grandfather     No Known Problems Daughter     No Known Problems Son      Social History     Tobacco Use    Smoking status: Former     Current packs/day: 0.50     Average packs/day: 0.5 packs/day for 6.0 years (3.0 ttl pk-yrs)     Types: Cigarettes    Smokeless tobacco: Never    Tobacco comments:     Pt sts 1 every once and awhile 22   Vaping Use    Vaping Use: Every day    Substances: Nicotine, Flavoring    Devices: Disposable, Refillable tank   Substance Use Topics    Alcohol  "use: Not Currently    Drug use: Never       Patient Active Problem List    Diagnosis Date Noted    Moderate episode of recurrent major depressive disorder (HCC) 05/23/2023    Vaginal discharge 11/28/2022    Chronic pain of left knee 09/22/2022    Nexplanon in place 07/28/2022    Neck swelling 07/28/2022    Obesity 11/27/2018    Tobacco dependence syndrome 11/27/2018     No current outpatient medications on file.     No current facility-administered medications for this visit.     No Known Allergies    Review of Systems   Constitutional: Negative for fever, chills and malaise/fatigue.   HENT: Negative for congestion.    Eyes: Negative for pain.   Respiratory: Negative for cough and shortness of breath.    Cardiovascular: Negative for chest pain and leg swelling.   Gastrointestinal: Negative for nausea, vomiting, abdominal pain and diarrhea.   Genitourinary: Negative for dysuria and hematuria.   Skin: Negative for rash.   Neurological: Negative for dizziness, focal weakness and headaches.   Endo/Heme/Allergies: Does not bruise/bleed easily.   Psychiatric/Behavioral: Negative for depression.  The patient is not nervous/anxious.      Objective:   /76   Pulse 74   Temp 36.8 °C (98.2 °F) (Temporal)   Ht 1.575 m (5' 2\")   Wt 82.1 kg (181 lb)   SpO2 97%   BMI 33.11 kg/m²     Wt Readings from Last 4 Encounters:   03/08/24 81.6 kg (180 lb)   03/01/24 80.7 kg (178 lb)   12/19/23 91.2 kg (201 lb)   11/17/23 93 kg (205 lb)         Physical Exam:  Constitutional: Well-developed and well-nourished. Not diaphoretic. No distress.   Skin: Skin is warm and dry. No rash noted.  Head: Atraumatic without lesions.  Eyes:  No scleral icterus.   Ears:  External ears unremarkable.   Nose: Nares patent. Septum midline. Turbinates without erythema nor edema. No discharge.   Mouth/Throat: Tongue normal. Oropharynx is clear and moist. Posterior pharynx without erythema or exudates.  Neck: Supple, trachea midline. Normal range of " motion.   Cardiovascular: Regular rate and rhythm, S1 and S2 without murmur, rubs, or gallops.    Respiratory: Effort normal. Clear to auscultation throughout. No adventitious sounds. .  Extremities: No cyanosis, clubbing, erythema, nor edema.   Musculoskeletal: All major joints AROM full in all directions without pain.  Neurological: Alert and oriented x 3. Grossly non-focal.   Psychiatric:  Behavior, mood, and affect are appropriate.    Assessment and Plan:   38-year-old here for follow-up to discuss Pap smear results.  No acute concerns.    1. Bacterial vaginosis  Status post course of metronidazole  Patient now asymptomatic and doing well  Patient denies any foul vaginal smell or any vaginal discharge  Provided counseling and reassurance  Reviewed all other Pap results      Health maintenance:     Labs per orders  Immunizations per orders  Patient counseled about skin care, diet, supplements, and exercise.  Discussed  STD prevention, family planning choices, adequate intake of calcium and vitamin D, diet and exercise, Kegel exercises     Follow-up: Wellness visit in 6 months

## 2024-05-13 DIAGNOSIS — S39.012A LUMBAR STRAIN, INITIAL ENCOUNTER: ICD-10-CM

## 2024-05-13 DIAGNOSIS — M54.50 LOW BACK PAIN: ICD-10-CM

## 2024-05-13 RX ORDER — CYCLOBENZAPRINE HCL 10 MG
10 TABLET ORAL 2 TIMES DAILY PRN
Qty: 28 TABLET | Refills: 0 | Status: SHIPPED | OUTPATIENT
Start: 2024-05-13 | End: 2024-05-27

## 2024-05-23 ENCOUNTER — NON-PROVIDER VISIT (OUTPATIENT)
Dept: URGENT CARE | Facility: PHYSICIAN GROUP | Age: 39
End: 2024-05-23

## 2024-05-23 DIAGNOSIS — Z02.1 PRE-EMPLOYMENT DRUG SCREENING: ICD-10-CM

## 2024-05-23 LAB
AMP AMPHETAMINE: NORMAL
COC COCAINE: NORMAL
INT CON NEG: NORMAL
INT CON POS: NORMAL
MET METHAMPHETAMINES: NORMAL
OPI OPIATES: NORMAL
PCP PHENCYCLIDINE: NORMAL
POC DRUG COMMENT 753798-OCCUPATIONAL HEALTH: NORMAL
THC: NORMAL

## 2024-05-23 PROCEDURE — 80305 DRUG TEST PRSMV DIR OPT OBS: CPT | Performed by: NURSE PRACTITIONER

## 2024-06-20 ENCOUNTER — PATIENT MESSAGE (OUTPATIENT)
Dept: MEDICAL GROUP | Facility: CLINIC | Age: 39
End: 2024-06-20
Payer: COMMERCIAL

## 2024-06-20 RX ORDER — CYCLOBENZAPRINE HCL 10 MG
TABLET ORAL
COMMUNITY
Start: 2024-05-10 | End: 2024-06-20 | Stop reason: SDUPTHER

## 2024-06-20 NOTE — PATIENT COMMUNICATION
Received request via: Pharmacy    Was the patient seen in the last year in this department? Yes    Does the patient have an active prescription (recently filled or refills available) for medication(s) requested? No    Pharmacy Name: [facility]    Does the patient have assisted Plus and need 100 day supply (blood pressure, diabetes and cholesterol meds only)? Patient does not have SCP

## 2024-06-21 RX ORDER — CYCLOBENZAPRINE HCL 10 MG
TABLET ORAL
Qty: 30 TABLET | Refills: 0 | Status: SHIPPED | OUTPATIENT
Start: 2024-06-21

## 2024-07-11 ENCOUNTER — APPOINTMENT (RX ONLY)
Dept: URBAN - METROPOLITAN AREA CLINIC 6 | Facility: CLINIC | Age: 39
Setting detail: DERMATOLOGY
End: 2024-07-11

## 2024-07-11 DIAGNOSIS — D22 MELANOCYTIC NEVI: ICD-10-CM

## 2024-07-11 PROBLEM — D22.9 MELANOCYTIC NEVI, UNSPECIFIED: Status: ACTIVE | Noted: 2024-07-11

## 2024-07-11 PROBLEM — D48.5 NEOPLASM OF UNCERTAIN BEHAVIOR OF SKIN: Status: ACTIVE | Noted: 2024-07-11

## 2024-07-11 PROCEDURE — ? COUNSELING

## 2024-07-11 PROCEDURE — ? ADDITIONAL NOTES

## 2024-07-11 PROCEDURE — 11102 TANGNTL BX SKIN SINGLE LES: CPT

## 2024-07-11 PROCEDURE — ? BIOPSY BY SHAVE METHOD

## 2024-07-11 PROCEDURE — 99202 OFFICE O/P NEW SF 15 MIN: CPT | Mod: 25

## 2024-07-11 ASSESSMENT — LOCATION SIMPLE DESCRIPTION DERM: LOCATION SIMPLE: LEFT NOSE

## 2024-07-11 ASSESSMENT — LOCATION DETAILED DESCRIPTION DERM: LOCATION DETAILED: LEFT NASAL ALA

## 2024-07-11 ASSESSMENT — LOCATION ZONE DERM: LOCATION ZONE: NOSE

## 2024-07-11 NOTE — PROCEDURE: ADDITIONAL NOTES
Additional Notes: .7cm. Discussed excision. Pt would like consult with our Mohs surgeon for removal given location
Detail Level: Simple
Render Risk Assessment In Note?: yes

## 2024-07-11 NOTE — PROCEDURE: BIOPSY BY SHAVE METHOD
Detail Level: Detailed
Depth Of Biopsy: dermis
Was A Bandage Applied: Yes
Size Of Lesion In Cm: 0.4
X Size Of Lesion In Cm: 0
Biopsy Type: H and E
Biopsy Method: Dermablade
Anesthesia Type: 1% lidocaine with epinephrine
Anesthesia Volume In Cc: 0.5
Hemostasis: Drysol
Wound Care: Petrolatum
Dressing: bandage
Destruction After The Procedure: No
Type Of Destruction Used: Electrodesiccation and Curettage
Curettage Text: The wound bed was treated with curettage after the biopsy was performed.
Cryotherapy Text: The wound bed was treated with cryotherapy after the biopsy was performed.
Electrodesiccation Text: The wound bed was treated with electrodesiccation after the biopsy was performed.
Electrodesiccation And Curettage Text: The wound bed was treated with electrodesiccation and curettage after the biopsy was performed. Treated x 3
Silver Nitrate Text: The wound bed was treated with silver nitrate after the biopsy was performed.
Lab: [id number]
Lab Facility: 
Consent: Written consent was obtained and risks were reviewed including but not limited to scarring, infection, bleeding, scabbing, incomplete removal, nerve damage and allergy to anesthesia.
No
Post-Care Instructions: I reviewed with the patient in detail post-care instructions. Patient is to keep the biopsy site dry overnight, and then apply bacitracin twice daily until healed. Patient may apply hydrogen peroxide soaks to remove any crusting.
Notification Instructions: Patient will be notified of biopsy results. However, patient instructed to call the office if not contacted within 2 weeks.
Billing Type: Third-Party Bill
Information: Selecting Yes will display possible errors in your note based on the variables you have selected. This validation is only offered as a suggestion for you. PLEASE NOTE THAT THE VALIDATION TEXT WILL BE REMOVED WHEN YOU FINALIZE YOUR NOTE. IF YOU WANT TO FAX A PRELIMINARY NOTE YOU WILL NEED TO TOGGLE THIS TO 'NO' IF YOU DO NOT WANT IT IN YOUR FAXED NOTE.

## 2024-08-16 ENCOUNTER — APPOINTMENT (RX ONLY)
Dept: URBAN - METROPOLITAN AREA CLINIC 36 | Facility: CLINIC | Age: 39
Setting detail: DERMATOLOGY
End: 2024-08-16

## 2024-08-16 DIAGNOSIS — D22 MELANOCYTIC NEVI: ICD-10-CM

## 2024-08-16 PROBLEM — D22.39 MELANOCYTIC NEVI OF OTHER PARTS OF FACE: Status: ACTIVE | Noted: 2024-08-16

## 2024-08-16 PROCEDURE — ? CONSULTATION FOR COSMETIC REMOVAL

## 2024-08-16 ASSESSMENT — LOCATION SIMPLE DESCRIPTION DERM: LOCATION SIMPLE: RIGHT FOREHEAD

## 2024-08-16 ASSESSMENT — LOCATION DETAILED DESCRIPTION DERM: LOCATION DETAILED: RIGHT INFERIOR MEDIAL FOREHEAD

## 2024-08-16 ASSESSMENT — LOCATION ZONE DERM: LOCATION ZONE: FACE

## 2024-08-29 RX ORDER — CYCLOBENZAPRINE HCL 10 MG
TABLET ORAL
Qty: 30 TABLET | Refills: 0 | Status: SHIPPED | OUTPATIENT
Start: 2024-08-29

## 2024-08-29 NOTE — TELEPHONE ENCOUNTER
Received request via: Pharmacy    Was the patient seen in the last year in this department? Yes    Does the patient have an active prescription (recently filled or refills available) for medication(s) requested? No    [facility]    Does the patient have alf Plus and need 100-day supply? (This applies to ALL medications) Patient does not have SCP

## 2024-09-17 ENCOUNTER — OFFICE VISIT (OUTPATIENT)
Dept: MEDICAL GROUP | Facility: CLINIC | Age: 39
End: 2024-09-17
Payer: COMMERCIAL

## 2024-09-17 VITALS
HEART RATE: 66 BPM | WEIGHT: 188 LBS | BODY MASS INDEX: 35.5 KG/M2 | TEMPERATURE: 97.5 F | DIASTOLIC BLOOD PRESSURE: 68 MMHG | SYSTOLIC BLOOD PRESSURE: 114 MMHG | OXYGEN SATURATION: 95 % | HEIGHT: 61 IN

## 2024-09-17 DIAGNOSIS — R09.81 NASAL CONGESTION: ICD-10-CM

## 2024-09-17 DIAGNOSIS — H66.004 RECURRENT ACUTE SUPPURATIVE OTITIS MEDIA OF RIGHT EAR WITHOUT SPONTANEOUS RUPTURE OF TYMPANIC MEMBRANE: ICD-10-CM

## 2024-09-17 DIAGNOSIS — R05.1 ACUTE COUGH: ICD-10-CM

## 2024-09-17 PROBLEM — H66.90 ACUTE OTITIS MEDIA: Status: ACTIVE | Noted: 2024-09-17

## 2024-09-17 PROCEDURE — 99213 OFFICE O/P EST LOW 20 MIN: CPT | Mod: GE

## 2024-09-17 RX ORDER — BENZONATATE 100 MG/1
100 CAPSULE ORAL 3 TIMES DAILY PRN
Qty: 60 CAPSULE | Refills: 0 | Status: CANCELLED | OUTPATIENT
Start: 2024-09-17

## 2024-09-17 RX ORDER — BENZONATATE 100 MG/1
100 CAPSULE ORAL 3 TIMES DAILY PRN
Qty: 60 CAPSULE | Refills: 0 | Status: SHIPPED | OUTPATIENT
Start: 2024-09-17

## 2024-09-17 ASSESSMENT — FIBROSIS 4 INDEX: FIB4 SCORE: 0.37

## 2024-09-17 NOTE — PROGRESS NOTES
SUBJECTIVE:     CC:   Chief Complaint   Patient presents with    Nasal Congestion     Congestion for 3 weeks         HPI:   Hannah presents today with 3 weeks of nasal congestion, productive cough (white-green sputum) for 3 weeks. Patient reports that her 6 year old son had the cold 3 weeks ago. She denies fever, chills, n/v/d. She states she has tried OTC mucinex, Tylenol cold and flu with no relief. She states she tried zyrtec, which has not helped relief her nasal symptoms as well.      Past Medical History:  No past medical history on file.    Patient Active Problem List:  Patient Active Problem List   Diagnosis    Obesity    Tobacco dependence syndrome    Nexplanon in place    Neck swelling    Chronic pain of left knee    Vaginal discharge    Moderate episode of recurrent major depressive disorder (HCC)     Surgical History:  Past Surgical History:   Procedure Laterality Date    PRIMARY C SECTION  08/2018    PRIMARY C SECTION  12/2006     Family History:  Family History   Problem Relation Age of Onset    Breast Cancer Mother     Diabetes Father     No Known Problems Brother     No Known Problems Brother     No Known Problems Maternal Grandmother     Cancer Maternal Grandfather     No Known Problems Paternal Grandmother     No Known Problems Paternal Grandfather     No Known Problems Daughter     No Known Problems Son      Social History:  Social History     Tobacco Use    Smoking status: Former     Current packs/day: 0.50     Average packs/day: 0.5 packs/day for 6.0 years (3.0 ttl pk-yrs)     Types: Cigarettes    Smokeless tobacco: Never    Tobacco comments:     Pt sts 1 every once and awhile 11/28/22   Vaping Use    Vaping status: Every Day    Substances: Nicotine, Flavoring    Devices: Disposable, Refillable tank   Substance Use Topics    Alcohol use: Not Currently    Drug use: Never     Medications:  Current Outpatient Medications on File Prior to Visit   Medication Sig Dispense Refill     "cyclobenzaprine (FLEXERIL) 10 mg Tab Take one tab as needed nightly for muscle spasms (Patient not taking: Reported on 9/17/2024) 30 Tablet 0     No current facility-administered medications on file prior to visit.       No Known Allergies      ROS:   Gen: no fevers/chills, no changes in weight  Eyes: no changes in vision  ENT: no changes in hearing  Pulm: no sob, no cough  CV: no chest pain, no palpitations  GI: no nausea/vomiting, no diarrhea  MSk: no myalgias  Skin: no rash  Neuro: no headaches, no numbness/tingling      OBJECTIVE:     Exam:  /68 (BP Location: Left arm, Patient Position: Sitting)   Pulse 66   Temp 36.4 °C (97.5 °F) (Temporal)   Ht 1.549 m (5' 1\")   Wt 85.3 kg (188 lb)   SpO2 95%   BMI 35.52 kg/m²  Body mass index is 35.52 kg/m².    Gen: Alert and oriented, No apparent distress.  Head:  NCAT, EOMI, sclera clear without discharge. Notable right bulging TM, slight erythematous.   Neck: Neck is supple without lymphadenopathy.  Lungs: Normal effort, CTA bilaterally, no wheezes, rhonchi, or rales  CV: Regular rate and rhythm. No murmurs, rubs, or gallops.  Abd:   Non-distended, soft  Ext: No clubbing, cyanosis, edema.  MSK: Unassisted gait  Psych: normal mood and affect      ASSESSMENT & PLAN:     39 y.o. female with the following -    Problem List Items Addressed This Visit       Acute otitis media  Notable right bulging TM, slight erythematous.   - Tylenol as needed for pain.    Relevant Medications    amoxicillin-clavulanate (AUGMENTIN) 875-125 MG Tab      Nasal congestion      - Recommend Flonase nasal spray.  - Recommend humidifier at night time.  - Encourage adequate hydration      Acute cough      - Will order benzonatate (TID, prn)    Relevant Medications    benzonatate (TESSALON) 100 MG Darrel Yusuf, PGY-3  [facility]  "

## 2024-11-08 ENCOUNTER — OFFICE VISIT (OUTPATIENT)
Dept: MEDICAL GROUP | Facility: CLINIC | Age: 39
End: 2024-11-08
Payer: COMMERCIAL

## 2024-11-08 VITALS
HEART RATE: 92 BPM | HEIGHT: 61 IN | WEIGHT: 190 LBS | OXYGEN SATURATION: 96 % | BODY MASS INDEX: 35.87 KG/M2 | RESPIRATION RATE: 16 BRPM | DIASTOLIC BLOOD PRESSURE: 86 MMHG | SYSTOLIC BLOOD PRESSURE: 137 MMHG

## 2024-11-08 DIAGNOSIS — L29.9 EAR ITCH: ICD-10-CM

## 2024-11-08 DIAGNOSIS — M25.531 CHRONIC PAIN OF RIGHT WRIST: ICD-10-CM

## 2024-11-08 DIAGNOSIS — G89.29 CHRONIC PAIN OF RIGHT WRIST: ICD-10-CM

## 2024-11-08 PROCEDURE — 3075F SYST BP GE 130 - 139MM HG: CPT | Mod: GC

## 2024-11-08 PROCEDURE — 3079F DIAST BP 80-89 MM HG: CPT | Mod: GC

## 2024-11-08 PROCEDURE — 99213 OFFICE O/P EST LOW 20 MIN: CPT | Mod: GE

## 2024-11-08 ASSESSMENT — FIBROSIS 4 INDEX: FIB4 SCORE: 0.37
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
## 2024-11-13 ENCOUNTER — HOSPITAL ENCOUNTER (OUTPATIENT)
Dept: RADIOLOGY | Facility: MEDICAL CENTER | Age: 39
End: 2024-11-13
Payer: COMMERCIAL

## 2024-11-13 DIAGNOSIS — M25.531 CHRONIC PAIN OF RIGHT WRIST: ICD-10-CM

## 2024-11-13 DIAGNOSIS — G89.29 CHRONIC PAIN OF RIGHT WRIST: ICD-10-CM

## 2024-11-13 PROCEDURE — 73110 X-RAY EXAM OF WRIST: CPT | Mod: RT

## 2024-11-18 DIAGNOSIS — R20.0 NUMBNESS OF RIGHT HAND: ICD-10-CM
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
## 2024-11-27 ENCOUNTER — OFFICE VISIT (OUTPATIENT)
Dept: PHYSICAL MEDICINE AND REHAB | Facility: MEDICAL CENTER | Age: 39
End: 2024-11-27
Payer: COMMERCIAL

## 2024-11-27 VITALS
WEIGHT: 190.7 LBS | TEMPERATURE: 98.2 F | HEART RATE: 90 BPM | HEIGHT: 61 IN | BODY MASS INDEX: 36 KG/M2 | DIASTOLIC BLOOD PRESSURE: 69 MMHG | OXYGEN SATURATION: 96 % | SYSTOLIC BLOOD PRESSURE: 128 MMHG

## 2024-11-27 DIAGNOSIS — M25.531 RIGHT WRIST PAIN: ICD-10-CM

## 2024-11-27 DIAGNOSIS — R20.0 NUMBNESS AND TINGLING IN RIGHT HAND: Primary | ICD-10-CM

## 2024-11-27 DIAGNOSIS — R20.2 NUMBNESS AND TINGLING IN RIGHT HAND: Primary | ICD-10-CM

## 2024-11-27 ASSESSMENT — PATIENT HEALTH QUESTIONNAIRE - PHQ9: CLINICAL INTERPRETATION OF PHQ2 SCORE: 0

## 2024-11-27 ASSESSMENT — FIBROSIS 4 INDEX: FIB4 SCORE: 0.37

## 2024-11-27 ASSESSMENT — PAIN SCALES - GENERAL: PAINLEVEL_OUTOF10: 2=MINIMAL-SLIGHT
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
## 2024-12-02 RX ORDER — CYCLOBENZAPRINE HCL 10 MG
TABLET ORAL
Qty: 15 TABLET | Refills: 0 | Status: SHIPPED | OUTPATIENT
Start: 2024-12-02

## 2024-12-02 NOTE — TELEPHONE ENCOUNTER
Received request via: Pharmacy    Was the patient seen in the last year in this department? Yes    Does the patient have an active prescription (recently filled or refills available) for medication(s) requested? No    Pharmacy Name:   [facility] [address] [place] [address]       Does the patient have FDC Plus and need 100-day supply? (This applies to ALL medications) Patient does not have SCP

## 2024-12-12 ENCOUNTER — OFFICE VISIT (OUTPATIENT)
Dept: PHYSICAL MEDICINE AND REHAB | Facility: MEDICAL CENTER | Age: 39
End: 2024-12-12
Payer: COMMERCIAL

## 2024-12-12 VITALS
WEIGHT: 191 LBS | OXYGEN SATURATION: 96 % | HEIGHT: 61 IN | HEART RATE: 82 BPM | BODY MASS INDEX: 36.06 KG/M2 | TEMPERATURE: 97.8 F | SYSTOLIC BLOOD PRESSURE: 114 MMHG | DIASTOLIC BLOOD PRESSURE: 72 MMHG

## 2024-12-12 DIAGNOSIS — R20.0 NUMBNESS AND TINGLING IN RIGHT HAND: ICD-10-CM

## 2024-12-12 DIAGNOSIS — R20.2 NUMBNESS AND TINGLING IN RIGHT HAND: ICD-10-CM

## 2024-12-12 PROCEDURE — 95909 NRV CNDJ TST 5-6 STUDIES: CPT | Performed by: GENERAL PRACTICE

## 2024-12-12 PROCEDURE — 1125F AMNT PAIN NOTED PAIN PRSNT: CPT | Performed by: GENERAL PRACTICE

## 2024-12-12 PROCEDURE — 95886 MUSC TEST DONE W/N TEST COMP: CPT | Mod: RT | Performed by: GENERAL PRACTICE

## 2024-12-12 PROCEDURE — 3078F DIAST BP <80 MM HG: CPT | Performed by: GENERAL PRACTICE

## 2024-12-12 PROCEDURE — 3074F SYST BP LT 130 MM HG: CPT | Performed by: GENERAL PRACTICE

## 2024-12-12 ASSESSMENT — FIBROSIS 4 INDEX: FIB4 SCORE: 0.37

## 2024-12-12 ASSESSMENT — PATIENT HEALTH QUESTIONNAIRE - PHQ9: CLINICAL INTERPRETATION OF PHQ2 SCORE: 0

## 2024-12-12 ASSESSMENT — PAIN SCALES - GENERAL: PAINLEVEL_OUTOF10: 5=MODERATE PAIN
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
## 2025-01-13 ENCOUNTER — HOSPITAL ENCOUNTER (OUTPATIENT)
Dept: RADIOLOGY | Facility: MEDICAL CENTER | Age: 40
End: 2025-01-13
Attending: STUDENT IN AN ORGANIZED HEALTH CARE EDUCATION/TRAINING PROGRAM
Payer: COMMERCIAL

## 2025-01-13 DIAGNOSIS — R20.2 NUMBNESS AND TINGLING IN RIGHT HAND: ICD-10-CM

## 2025-01-13 DIAGNOSIS — R20.0 NUMBNESS AND TINGLING IN RIGHT HAND: ICD-10-CM

## 2025-01-13 DIAGNOSIS — M25.531 RIGHT WRIST PAIN: ICD-10-CM

## 2025-01-13 PROCEDURE — 73221 MRI JOINT UPR EXTREM W/O DYE: CPT | Mod: RT

## 2025-01-28 ENCOUNTER — OFFICE VISIT (OUTPATIENT)
Dept: PHYSICAL MEDICINE AND REHAB | Facility: MEDICAL CENTER | Age: 40
End: 2025-01-28
Payer: COMMERCIAL

## 2025-01-28 VITALS
TEMPERATURE: 97.4 F | WEIGHT: 195.99 LBS | BODY MASS INDEX: 37 KG/M2 | SYSTOLIC BLOOD PRESSURE: 108 MMHG | OXYGEN SATURATION: 97 % | HEART RATE: 67 BPM | DIASTOLIC BLOOD PRESSURE: 68 MMHG | HEIGHT: 61 IN

## 2025-01-28 DIAGNOSIS — M77.8 EXTENSOR CARPI ULNARIS TENDINITIS: Primary | ICD-10-CM

## 2025-01-28 PROCEDURE — 3074F SYST BP LT 130 MM HG: CPT | Performed by: STUDENT IN AN ORGANIZED HEALTH CARE EDUCATION/TRAINING PROGRAM

## 2025-01-28 PROCEDURE — 3078F DIAST BP <80 MM HG: CPT | Performed by: STUDENT IN AN ORGANIZED HEALTH CARE EDUCATION/TRAINING PROGRAM

## 2025-01-28 PROCEDURE — 1125F AMNT PAIN NOTED PAIN PRSNT: CPT | Performed by: STUDENT IN AN ORGANIZED HEALTH CARE EDUCATION/TRAINING PROGRAM

## 2025-01-28 PROCEDURE — 99214 OFFICE O/P EST MOD 30 MIN: CPT | Performed by: STUDENT IN AN ORGANIZED HEALTH CARE EDUCATION/TRAINING PROGRAM

## 2025-01-28 RX ORDER — CELECOXIB 100 MG/1
100 CAPSULE ORAL 2 TIMES DAILY
Qty: 60 CAPSULE | Refills: 1 | Status: SHIPPED | OUTPATIENT
Start: 2025-01-28

## 2025-01-28 ASSESSMENT — PATIENT HEALTH QUESTIONNAIRE - PHQ9: CLINICAL INTERPRETATION OF PHQ2 SCORE: 0

## 2025-01-28 ASSESSMENT — FIBROSIS 4 INDEX: FIB4 SCORE: 0.37

## 2025-01-28 ASSESSMENT — PAIN SCALES - GENERAL: PAINLEVEL_OUTOF10: 5=MODERATE PAIN

## 2025-01-28 NOTE — PROGRESS NOTES
Verbal consent was acquired by the patient to use [employer] ambient listening note generation during this visit Yes     FOLLOW-UP PATIENT VISIT    Interventional Spine and Pain  Physiatry (Physical Medicine and Rehabilitation)     Date of Service: 25   Chief Complaint:   Chief Complaint   Patient presents with    Follow-Up     Hand numbness      Patient Name: Hannah Winters   : 1985   MRN: [id number]       PRIOR HISTORY    Please see new patient note by Dr. Rubio for more details.     Interval History  Patient identification: Hannah Winters,  1985, with history of depression who presents today for follow-up of right wrist pain and hand numbness.    History of Present Illness  The patient is a 39-year-old female who presents today for follow-up of right wrist pain.    She reports no significant change in her pain levels, which have remained consistent around 5 out of 10 in intensity. She had an EMG of the right upper extremity on 24 which was normal. Despite the discomfort, she continues to perform her work duties, although with some difficulty due to the current understaffing at her workplace. Her job requires her to work on two lines instead of one, a task she finds challenging. She has not previously used prescription anti-inflammatories such as meloxicam, Mobic, Celebrex, or celecoxib. She expresses a preference for home-based exercises due to her unpredictable work schedule, which includes mandatory overtime and night shifts. She has been managing her pain with Flexeril, taken at night upon returning home.          PMHx:   History reviewed. No pertinent past medical history.    PSHx:   Past Surgical History:   Procedure Laterality Date    PRIMARY C SECTION  2018    PRIMARY C SECTION  2006       Family history   Family History   Problem Relation Age of Onset    Breast Cancer Mother     Diabetes Father     No Known Problems Brother     No Known Problems Brother      No Known Problems Maternal Grandmother     Cancer Maternal Grandfather     No Known Problems Paternal Grandmother     No Known Problems Paternal Grandfather     No Known Problems Daughter     No Known Problems Son        Medications:   Outpatient Medications Marked as Taking for the 1/28/25 encounter (Office Visit) with Estelita Rubio M.D.   Medication Sig Dispense Refill    celecoxib (CELEBREX) 100 MG Cap Take 1 Capsule by mouth 2 times a day. 60 Capsule 1    cyclobenzaprine (FLEXERIL) 10 mg Tab Take one tab as needed nightly for muscle spasms 15 Tablet 0        Current Outpatient Medications on File Prior to Visit   Medication Sig Dispense Refill    cyclobenzaprine (FLEXERIL) 10 mg Tab Take one tab as needed nightly for muscle spasms 15 Tablet 0    benzonatate (TESSALON) 100 MG Cap Take 1 Capsule by mouth 3 times a day as needed for Cough. 60 Capsule 0     No current facility-administered medications on file prior to visit.       Allergies:   No Known Allergies    Social Hx:   Social History     Socioeconomic History    Marital status: Single     Spouse name: Not on file    Number of children: Not on file    Years of education: Not on file    Highest education level: 12th grade   Occupational History    Not on file   Tobacco Use    Smoking status: Former     Current packs/day: 0.50     Average packs/day: 0.5 packs/day for 6.0 years (3.0 ttl pk-yrs)     Types: Cigarettes    Smokeless tobacco: Never    Tobacco comments:     Pt sts 1 every once and awhile 11/28/22   Vaping Use    Vaping status: Every Day    Substances: Nicotine, Flavoring    Devices: Disposable, Refillable tank   Substance and Sexual Activity    Alcohol use: Not Currently    Drug use: Never    Sexual activity: Not Currently     Birth control/protection: Implant   Other Topics Concern    Not on file   Social History Narrative    Not on file     Social Drivers of Health     Financial Resource Strain: Low Risk  (11/25/2022)    Overall Financial  "Resource Strain (CARDIA)     Difficulty of Paying Living Expenses: Not hard at all   Food Insecurity: No Food Insecurity (11/25/2022)    Hunger Vital Sign     Worried About Running Out of Food in the Last Year: Never true     Ran Out of Food in the Last Year: Never true   Transportation Needs: No Transportation Needs (11/25/2022)    PRAPARE - Transportation     Lack of Transportation (Medical): No     Lack of Transportation (Non-Medical): No   Physical Activity: Sufficiently Active (11/25/2022)    Exercise Vital Sign     Days of Exercise per Week: 3 days     Minutes of Exercise per Session: 50 min   Stress: No Stress Concern Present (11/25/2022)    [employer] - Occupational Stress Questionnaire     Feeling of Stress : Only a little   Social Connections: Socially Isolated (11/25/2022)    Social Connection and Isolation Panel [NHANES]     Frequency of Communication with Friends and Family: More than three times a week     Frequency of Social Gatherings with Friends and Family: Patient declined     Attends Orthodoxy Services: Never     Active Member of Clubs or Organizations: No     Attends Club or Organization Meetings: Never     Marital Status: Never    Intimate Partner Violence: Not on file   Housing Stability: Low Risk  (11/25/2022)    Housing Stability Vital Sign     Unable to Pay for Housing in the Last Year: No     Number of Places Lived in the Last Year: 1     Unstable Housing in the Last Year: No         EXAMINATION     Physical Exam:   Vitals: /68 (BP Location: Left arm, Patient Position: Sitting, BP Cuff Size: Large adult)   Pulse 67   Temp 36.3 °C (97.4 °F) (Temporal)   Ht 1.549 m (5' 1\")   Wt 88.9 kg (195 lb 15.8 oz)   SpO2 97%         Constitutional:   Body Habitus: Body mass index is 37.03 kg/m².  Cooperation: Fully cooperates with exam  Appearance: Well-groomed, well-nourished  Respiratory:  Breathing comfortable on room air, no audible " wheezing  Cardiovascular: Skin appears well-perfused  Psychiatric: Appropriate affect  Gait: normal gait, no use of ambulatory device, nonantalgic.     Neurologic:  Strength:    Bilateral UE 5/5 in wrist extensors, finger abductors, and finger flexors       MEDICAL DECISION MAKING    DATA    Labs:   Lab Results   Component Value Date/Time    SODIUM 141 11/10/2023 09:28 AM    POTASSIUM 3.9 11/10/2023 09:28 AM    CHLORIDE 108 11/10/2023 09:28 AM    CO2 21 11/10/2023 09:28 AM    GLUCOSE 103 (H) 11/10/2023 09:28 AM    BUN 13 11/10/2023 09:28 AM    CREATININE 0.74 11/10/2023 09:28 AM          Lab Results   Component Value Date/Time    WBC 7.6 08/09/2023 11:18 AM    RBC 4.45 08/09/2023 11:18 AM    HEMOGLOBIN 13.2 08/09/2023 11:18 AM    HEMATOCRIT 41.0 08/09/2023 11:18 AM    MCV 92.1 08/09/2023 11:18 AM    MCH 29.7 08/09/2023 11:18 AM    MCHC 32.2 08/09/2023 11:18 AM    MPV 9.9 08/09/2023 11:18 AM    NEUTSPOLYS 64.40 08/09/2023 11:18 AM    LYMPHOCYTES 28.40 08/09/2023 11:18 AM    MONOCYTES 6.40 08/09/2023 11:18 AM    EOSINOPHILS 0.10 08/09/2023 11:18 AM    BASOPHILS 0.40 08/09/2023 11:18 AM        Lab Results   Component Value Date/Time    HBA1C 5.7 (H) 11/10/2023 09:28 AM          Imaging:     I reviewed the following radiology reports  MRI Right Wrist 1/13/2025:  FINDINGS:     There is a marker along the medial aspect of the wrist.     ALIGNMENT: Normal.     JOINTS:  Effusion: No effusion.  First carpometacarpal joint:No arthropathy detected.  Triscaphe joint:No arthropathy detected.  Radio-scaphoid articulation: Normal.     BONES:  No fractures or osseous contusions.  No flexion deformity of the scaphoid.     Ligaments:  - Scapholunate ligament: Intact  - Lunotriquetral ligament: Intact  - Dorsal capsule: No synovitis. No ganglion cyst.  - Volar radiocarpal extrinsic ligament: Intact.  - Triangular fibrocartilage: Intact TFC disc. Normal foveal and ulnar styloid attachment.  - Distal radioulnar joint effusion: Trace  fluid.        EXTENSOR COMPARTMENT:  - I (Abductor pollicis longus, extensor pollicis brevis): Normal  - II (Extensor carpi radialis brevis and longus): Normal  - III (Extensor pollicis longus): Normal  - IV (Extensor digitorum and indicis): Normal  - V (Extensor digiti minimi): Normal  - VI (Extensor carpi ulnaris): Mildly thickened     FLEXOR COMPARTMENT:  - Carpal tunnel: Normal.  - Flexor retinaculum: Intact.  - Flexor tendons: Normal  -Median nerve: Normal signal and appearance throughout the carpal tunnel.     [place] canal: Normal.     Muscle: Normal thenar and hyperthenar musculature.  __________________________________     IMPRESSION:        1. Mild tendinosis of the extensor carpi ulnaris.  2. No osteoarthritis. No ligamentous injury.      DIAGNOSIS   Visit Diagnoses     ICD-10-CM   1. Extensor carpi ulnaris tendinitis  M77.8         ASSESSMENT and PLAN:     Hannah Winters is a 39 y.o. female who returns to clinic for follow-up of right wrist pain.    Hannah was seen today for follow-up.    Diagnoses and all orders for this visit:    Extensor carpi ulnaris tendinitis  -     celecoxib (CELEBREX) 100 MG Cap; Take 1 Capsule by mouth 2 times a day.  -     Referral to Hand Therapy        Assessment & Plan  Right wrist pain  Right extensor carpi ulnaris tendinitis  Patient presents for follow-up of moderate to severe right wrist pain. The nerve conduction study results were within normal limits, indicating no significant compression or injury that would necessitate surgical intervention. The MRI of the right wrist revealed mild thickening and inflammation of the extensor carpi ulnaris tendon, which is likely the source of her symptoms. A referral to a hand therapist will be made for targeted exercises and stretches to alleviate inflammation, strengthen the area, and address potential tendon fraying. A prescription for Celebrex 200 mg, to be taken twice daily, once in the morning and once in the evening,  will be provided for pain control. She is advised to take this medication with food and to avoid concurrent use with other anti-inflammatories such as ibuprofen, Advil, or Aleve.        Follow up: In 6 weeks    Thank you for allowing me to participate in the care of this patient. If you have any questions please not hesitate to contact me.         Please note that this dictation was created using voice recognition software. I have made every reasonable attempt to correct obvious errors but there may be errors of grammar and content that I may have overlooked prior to finalization of this note.    Estelita Rubio MD  Interventional Spine and Sports Physiatry  Physical Medicine and Rehabilitation  [facility]

## 2025-03-04 ENCOUNTER — OCCUPATIONAL THERAPY (OUTPATIENT)
Dept: OCCUPATIONAL THERAPY | Facility: REHABILITATION | Age: 40
End: 2025-03-04
Attending: STUDENT IN AN ORGANIZED HEALTH CARE EDUCATION/TRAINING PROGRAM
Payer: COMMERCIAL

## 2025-03-04 DIAGNOSIS — M77.8 EXTENSOR CARPI ULNARIS TENDINITIS: ICD-10-CM

## 2025-03-04 PROCEDURE — 97165 OT EVAL LOW COMPLEX 30 MIN: CPT

## 2025-03-04 PROCEDURE — 97014 ELECTRIC STIMULATION THERAPY: CPT

## 2025-03-04 PROCEDURE — 97110 THERAPEUTIC EXERCISES: CPT

## 2025-03-04 ASSESSMENT — ENCOUNTER SYMPTOMS
PAIN SCALE: 5
PAIN SCALE AT HIGHEST: 9
QUALITY: ACHING
PAIN SCALE AT LOWEST: 5

## 2025-03-04 NOTE — OP THERAPY EVALUATION
Outpatient Occupational Therapy  HAND THERAPY INITIAL EVALUATION    [facility] Occupational Therapy [address]  [address]  [place] [address]  Phone:  [phone]  Fax:  [phone]    Date of Evaluation: 2025    Patient: Hannah Winters  YOB: 1985  MRN: [id number]     Referring Provider: Estelita Rubio M.D.  [address]  [address]  [place] [address]   Referring Diagnosis Other enthesopathies, not elsewhere classified [M77.8]     Time Calculation    Start time: 0245  Stop time: 0330 Time Calculation (min): 45 minutes             Chief Complaint: Tendonitis and Self Care Duties    Visit Diagnoses     ICD-10-CM   1. Extensor carpi ulnaris tendinitis  M77.8       Subjective:   History of Present Illness:     Date of onset:  2024    Mechanism of injury:  As per referring provider's office visit:  The patient is a 39-year-old female who presents today for follow-up of right wrist pain.     She reports no significant change in her pain levels, which have remained consistent around 5 out of 10 in intensity. She had an EMG of the right upper extremity on 24 which was normal. Despite the discomfort, she continues to perform her work duties, although with some difficulty due to the current understaffing at her workplace. Her job requires her to work on two lines instead of one, a task she finds challenging. She has not previously used prescription anti-inflammatories such as meloxicam, Mobic, Celebrex, or celecoxib. She expresses a preference for home-based exercises due to her unpredictable work schedule, which includes mandatory overtime and night shifts. She has been managing her pain with Flexeril, taken at night upon returning home.    Prior level of function:  Independent and works full time.  Currently on leave for another medical issue and will return on light duty.  Pain:     Current pain ratin    At best pain ratin    At worst pain rating:   "9    Location:  Ulnar aspect of right wrist.    Quality:  Aching    Pain timing: during work tasks that aggravate symptoms.    Relieving factors:  Rest (has noticed improvement as she has been off work for a couple of weeks)    Aggravating factors:  Gripping and overhead activity    Progression:  Worsening  Hand dominance:  Right  Diagnostic Tests:     MRI studies: abnormal      Diagnostic Tests Comments:  Mild extensor carpi ulnaris tendonitis    Treatments:     None      Treatment Comments:  Patient has used wrist splint, but finds it aggravates the symptoms   Patient Goals:     Patient goals for therapy:  Decreased pain, independence with ADLs/IADLs, return to sport/leisure activities and return to work      No past medical history on file.  Past Surgical History:   Procedure Laterality Date    PRIMARY C SECTION  08/2018    PRIMARY C SECTION  12/2006     Social History     Tobacco Use    Smoking status: Former     Current packs/day: 0.50     Average packs/day: 0.5 packs/day for 6.0 years (3.0 ttl pk-yrs)     Types: Cigarettes    Smokeless tobacco: Never    Tobacco comments:     Pt sts 1 every once and awhile 11/28/22   Substance Use Topics    Alcohol use: Not Currently     Family and Occupational History     Socioeconomic History    Marital status: Single     Spouse name: Not on file    Number of children: Not on file    Years of education: Not on file    Highest education level: 12th grade   Occupational History    Not on file       Objective     Neurological Testing   Sensation   Wrist/Hand     Right   Intact: light touch      Additional Neurological Details  Occasional \"tingling\" sensation down ulnar border of right wrist and hand     Palpation   Right Tenderness of the extensor carpi ulnaris.     Tenderness     Right Wrist/Hand   Tenderness in the sixth dorsal compartment.     Active Range of Motion     Right Wrist   Normal active range of motion    Right Thumb     Normal active range of motion    Right " Digits   Normal active range of motion    Strength     Left Wrist/Hand      (2nd hand position)     Trial 1: 51    Right Wrist/Hand      (2nd hand position)     Trial 1: 59    Tests     Right Elbow   Positive Tinel's sign (cubital tunnel).     Right Shoulder   Positive ULTT1.     Additional Tests Details  Tenderness at distal ECU tendon on palpation   Ulnar nerve tightness on full stretch         Therapeutic Exercises (CPT 67356):     1. ulnar nerve glides    2. theraputty for hand strengthening    Therapeutic Exercise Summary:  Initiated HEP and to complete 3 x a day  Discussed ergonomics in the workplace and to assess if there are different approaches to complete work tasks to minimize aggravation of symptoms     Therapeutic Treatments and Modalities:    1. E Stim Unattended (CPT 85027)    Therapeutic Treatments and Modalities Summary: IFC with heat  at ulnar aspect of right wrist completed after evaluation as part of pain management     Time-based treatments/modalities:  Occupational Therapy Timed Treatment Charges  Therapeutic exercise minutes (CPT 21762): 15 minutes      Assessment and Plan:   Problem list/assessment: decreased HEP knowledge, decreased strength, limited ADL's and pain    Assessment details:  Patient is a 40 y/o female referred to OT due to extensor carpi ulnaris tendonitis and would benefit from OT intervention to minimize limitations and enhance functional use of dominant UE  Barriers to therapy:  None    Goals:   Short Term Goals: decrease pain, increase ADL independence, increase soft tissue/skin mobility, increase strength and independent with HEP performance  Short term goal timespan:  2-4 weeks    Long Term Goals:   Patient will have 2/10 pain in R UE during work tasks utilizing compensatory techniques  Patient will incorporate ergonomic techniques in the workplace to minimize aggravation of symptoms  Patient will score at least 75/80 on the UEFI   Long term goal timespan:  6-8  weeks    Plan:   Occupational/Hand Therapy options:  Occupational therapy treatment to continue  Planned therapy interventions:  Adaptive training to increase functional performance, home exercise training, patient/family/caregiver education, AROM, A/AROM, PROM, passive stretch, graded resistive tasks to increase strength, jobsite analysis and pain management  Planned modality interventions: electrical stimulation - unattended (CPT 97956) and moist hot pack (CPT 54137)  Prognosis: excellent    Frequency:  1x week  Duration in weeks:  8  Duration in visits:  8  Discussed with:  Patient and family  Patient/caregiver understanding of therapy treatment and goals: Good understanding of therapy treatment and goals      Functional Assessment Used  UEFI = 69/80        Referring provider co-signature:  I have reviewed this plan of care and my co-signature certifies the need for services.    Certification Period: 03/04/2025 to  04/30/25    Physician Signature: ________________________________ Date: ______________

## 2025-03-05 ASSESSMENT — ENCOUNTER SYMPTOMS
EXACERBATED BY: GRIPPING
ALLEVIATING FACTORS: REST
EXACERBATED BY: OVERHEAD ACTIVITY

## 2025-03-06 NOTE — PROGRESS NOTES
Verbal consent was acquired by the patient to use [employer] ambient listening note generation during this visit Yes     FOLLOW-UP PATIENT VISIT    Interventional Spine and Pain  Physiatry (Physical Medicine and Rehabilitation)     Date of Service: 25   Chief Complaint:   Chief Complaint   Patient presents with    Follow-Up     Extensor carpi ulnaris tendinitis      Patient Name: Hannah Winters   : 1985   MRN: [id number]       PRIOR HISTORY    Please see new patient note by Dr. Rubio for more details.     Interval History  Patient identification: Hannah Winters,  1985, with history of depression who presents today for follow-up of right wrist pain and hand numbness.     History of Present Illness  The patient is a 39-year-old female who presents today for follow-up of right hand and wrist pain.    She reports a slight improvement in her condition since the last visit, attributing this to a 2-week period of rest from work while she was undergoing other surgery. She has initiated hand therapy, with one session completed and another scheduled for today. The therapist has provided her with a dough-like material for strength and mobility exercises at home. She continues to experience intermittent numbness in her hand, particularly when lying down or holding her phone for approximately 10 minutes but reports this seems to be happening less frequently. She recently resumed work, with her first day back being last Friday, which she managed without significant difficulty.    Procedures:  24: EMG RUE, within normal limits.        PMHx:   History reviewed. No pertinent past medical history.    PSHx:   Past Surgical History:   Procedure Laterality Date    TUBAL LIGATION Bilateral 2025    PRIMARY C SECTION  2018    PRIMARY C SECTION  2006       Family history   Family History   Problem Relation Age of Onset    Breast Cancer Mother     Diabetes Father     No Known Problems  Brother     No Known Problems Brother     No Known Problems Maternal Grandmother     Cancer Maternal Grandfather     No Known Problems Paternal Grandmother     No Known Problems Paternal Grandfather     No Known Problems Daughter     No Known Problems Son        Medications:   Outpatient Medications Marked as Taking for the 3/11/25 encounter (Office Visit) with Estelita Rubio M.D.   Medication Sig Dispense Refill    celecoxib (CELEBREX) 100 MG Cap Take 1 Capsule by mouth 2 times a day. 60 Capsule 1    cyclobenzaprine (FLEXERIL) 10 mg Tab Take one tab as needed nightly for muscle spasms 15 Tablet 0        Current Outpatient Medications on File Prior to Visit   Medication Sig Dispense Refill    celecoxib (CELEBREX) 100 MG Cap Take 1 Capsule by mouth 2 times a day. 60 Capsule 1    cyclobenzaprine (FLEXERIL) 10 mg Tab Take one tab as needed nightly for muscle spasms 15 Tablet 0    benzonatate (TESSALON) 100 MG Cap Take 1 Capsule by mouth 3 times a day as needed for Cough. 60 Capsule 0     No current facility-administered medications on file prior to visit.       Allergies:   No Known Allergies    Social Hx:   Social History     Socioeconomic History    Marital status: Single     Spouse name: Not on file    Number of children: Not on file    Years of education: Not on file    Highest education level: 12th grade   Occupational History    Not on file   Tobacco Use    Smoking status: Former     Current packs/day: 0.50     Average packs/day: 0.5 packs/day for 6.0 years (3.0 ttl pk-yrs)     Types: Cigarettes    Smokeless tobacco: Never    Tobacco comments:     Pt sts 1 every once and awhile 11/28/22   Vaping Use    Vaping status: Every Day    Substances: Nicotine, Flavoring    Devices: Disposable, Refillable tank   Substance and Sexual Activity    Alcohol use: Not Currently    Drug use: Never    Sexual activity: Not Currently     Birth control/protection: Implant   Other Topics Concern    Not on file   Social History  "Narrative    Not on file     Social Drivers of Health     Financial Resource Strain: Low Risk  (11/25/2022)    Overall Financial Resource Strain (CARDIA)     Difficulty of Paying Living Expenses: Not hard at all   Food Insecurity: No Food Insecurity (11/25/2022)    Hunger Vital Sign     Worried About Running Out of Food in the Last Year: Never true     Ran Out of Food in the Last Year: Never true   Transportation Needs: No Transportation Needs (11/25/2022)    PRAPARE - Transportation     Lack of Transportation (Medical): No     Lack of Transportation (Non-Medical): No   Physical Activity: Sufficiently Active (11/25/2022)    Exercise Vital Sign     Days of Exercise per Week: 3 days     Minutes of Exercise per Session: 50 min   Stress: No Stress Concern Present (11/25/2022)    [employer] - Occupational Stress Questionnaire     Feeling of Stress : Only a little   Social Connections: Socially Isolated (11/25/2022)    Social Connection and Isolation Panel [NHANES]     Frequency of Communication with Friends and Family: More than three times a week     Frequency of Social Gatherings with Friends and Family: Patient declined     Attends Yazidism Services: Never     Active Member of Clubs or Organizations: No     Attends Club or Organization Meetings: Never     Marital Status: Never    Intimate Partner Violence: Not on file   Housing Stability: Low Risk  (11/25/2022)    Housing Stability Vital Sign     Unable to Pay for Housing in the Last Year: No     Number of Places Lived in the Last Year: 1     Unstable Housing in the Last Year: No         EXAMINATION     Physical Exam:   Vitals: BP (!) 140/86 (BP Location: Right arm, Patient Position: Sitting, BP Cuff Size: Adult)   Pulse 85   Temp 37.6 °C (99.6 °F) (Temporal)   Ht 1.549 m (5' 1\")   Wt 89.6 kg (197 lb 8.5 oz)   SpO2 96%       Constitutional:   Body Habitus: Body mass index is 37.32 kg/m².  Cooperation: Fully cooperates with " exam  Appearance: Well-groomed, well-nourished  Respiratory:  Breathing comfortable on room air, no audible wheezing  Cardiovascular: Skin appears well-perfused  Psychiatric: Appropriate affect  Gait: normal gait, no use of ambulatory device, nonantalgic.    Neurologic:  Strength:    Bilateral UE 5/5 in wrist extensors, finger abductors, and finger flexors   Sensation: grossly intact bilaterally dermatomes C5-T1      MEDICAL DECISION MAKING    DATA    Labs:   Lab Results   Component Value Date/Time    SODIUM 141 11/10/2023 09:28 AM    POTASSIUM 3.9 11/10/2023 09:28 AM    CHLORIDE 108 11/10/2023 09:28 AM    CO2 21 11/10/2023 09:28 AM    GLUCOSE 103 (H) 11/10/2023 09:28 AM    BUN 13 11/10/2023 09:28 AM    CREATININE 0.74 11/10/2023 09:28 AM        Lab Results   Component Value Date/Time    WBC 7.6 08/09/2023 11:18 AM    RBC 4.45 08/09/2023 11:18 AM    HEMOGLOBIN 13.2 08/09/2023 11:18 AM    HEMATOCRIT 41.0 08/09/2023 11:18 AM    MCV 92.1 08/09/2023 11:18 AM    MCH 29.7 08/09/2023 11:18 AM    MCHC 32.2 08/09/2023 11:18 AM    MPV 9.9 08/09/2023 11:18 AM    NEUTSPOLYS 64.40 08/09/2023 11:18 AM    LYMPHOCYTES 28.40 08/09/2023 11:18 AM    MONOCYTES 6.40 08/09/2023 11:18 AM    EOSINOPHILS 0.10 08/09/2023 11:18 AM    BASOPHILS 0.40 08/09/2023 11:18 AM        Lab Results   Component Value Date/Time    HBA1C 5.7 (H) 11/10/2023 09:28 AM          Imaging:   I reviewed the following radiology reports  MRI Right Wrist 1/13/2025:  FINDINGS:     There is a marker along the medial aspect of the wrist.     ALIGNMENT: Normal.     JOINTS:  Effusion: No effusion.  First carpometacarpal joint:No arthropathy detected.  Triscaphe joint:No arthropathy detected.  Radio-scaphoid articulation: Normal.     BONES:  No fractures or osseous contusions.  No flexion deformity of the scaphoid.     Ligaments:  - Scapholunate ligament: Intact  - Lunotriquetral ligament: Intact  - Dorsal capsule: No synovitis. No ganglion cyst.  - Volar radiocarpal  extrinsic ligament: Intact.  - Triangular fibrocartilage: Intact TFC disc. Normal foveal and ulnar styloid attachment.  - Distal radioulnar joint effusion: Trace fluid.        EXTENSOR COMPARTMENT:  - I (Abductor pollicis longus, extensor pollicis brevis): Normal  - II (Extensor carpi radialis brevis and longus): Normal  - III (Extensor pollicis longus): Normal  - IV (Extensor digitorum and indicis): Normal  - V (Extensor digiti minimi): Normal  - VI (Extensor carpi ulnaris): Mildly thickened     FLEXOR COMPARTMENT:  - Carpal tunnel: Normal.  - Flexor retinaculum: Intact.  - Flexor tendons: Normal  -Median nerve: Normal signal and appearance throughout the carpal tunnel.     Guyon canal: Normal.     Muscle: Normal thenar and hyperthenar musculature.  __________________________________     IMPRESSION:        1. Mild tendinosis of the extensor carpi ulnaris.  2. No osteoarthritis. No ligamentous injury.      DIAGNOSIS   Visit Diagnoses     ICD-10-CM   1. Extensor carpi ulnaris tendinitis  M77.8   2. Right wrist pain  M25.531         ASSESSMENT and PLAN:     Hannah Winters is a 39 y.o. female who returns to clinic for follow-up of right wrist and hand pain.    Hannah was seen today for follow-up.    Diagnoses and all orders for this visit:    Extensor carpi ulnaris tendinitis    Right wrist pain        Assessment & Plan  Right extensor carpi ulnaris tendinitis  Right wrist and hand pain  Patient presents today for follow-up of right hand and wrist pain in the setting of right extensor carpi ulnaris tendinitis. Her pain level has improved to a 3 out of 10 after a period of 2 weeks of rest from work. She has started hand therapy and has completed one session, with the second session scheduled for today. She is advised to continue with hand therapy to improve strength and mobility. If there is no improvement, especially upon returning to work, an injection into the affected area may be considered. However, this is  not the preferred option due to potential long-term risks to the tendon. Referral to a hand surgeon for further evaluation and discussion of alternative treatment options is also a possibility.         Follow up: 1 month    Thank you for allowing me to participate in the care of this patient. If you have any questions please not hesitate to contact me.         Please note that this dictation was created using voice recognition software. I have made every reasonable attempt to correct obvious errors but there may be errors of grammar and content that I may have overlooked prior to finalization of this note.    Estelita Rubio MD  Interventional Spine and Sports Physiatry  Physical Medicine and Rehabilitation  [facility]

## 2025-03-10 NOTE — OP THERAPY DAILY TREATMENT
"  Outpatient Occupational Therapy  DAILY TREATMENT     [facility] Occupational Therapy [address]  [address]  [place] [address]  Phone:  [phone]  Fax:  [phone]    Date: 03/11/2025    Patient: Hannah Winters  YOB: 1985  MRN: [id number]     Time Calculation  Start time: 0200  Stop time: 0245 Time Calculation (min): 45 minutes         Chief Complaint: Tendonitis and Self Care Duties    Visit #: 2    SUBJECTIVE:  The pain is gone now     OBJECTIVE:  Current objective measures:   Neurological Testing   Sensation   Wrist/Hand      Right   Intact: light touch  Occasional \"tingling\" sensation down ulnar border of right wrist and hand      Palpation   Right  no tenderness of the extensor carpi ulnaris.      Tenderness      Right Wrist/Hand   Tenderness in the sixth dorsal compartment.      Active Range of Motion      Right Wrist   Normal active range of motion     Right Thumb     Normal active range of motion     Right Digits   Normal active range of motion     Strength      Left Wrist/Hand    (2nd hand position)     Trial 1: 51     Right Wrist/Hand    (2nd hand position)     Trial 1: 61     Tests   Right Elbow:  Negative Tinel's sign (cubital tunnel).    minimal   Ulnar nerve tightness on full stretch         Therapeutic Exercises (CPT 18760):     1. ulnar nerve glides    2. theraband exercises for upper and mid back strengthening, medbridge - added to HEP    3. stretches to enhance posture, medbridge - added to HEP      Time-based treatments/modalities:  Therapeutic exercise minutes (CPT 80223): 45 minutes        Pain rating before treatment: 0  Pain rating after treatment: 0    ASSESSMENT:   Response to treatment: patient has progressed well and tendonitis has resolved     PLAN/RECOMMENDATIONS:   Plan for treatment: no further treatment needed.  Planned interventions for next visit:  last therapy session today and to continue with HEP         "

## 2025-03-11 ENCOUNTER — OFFICE VISIT (OUTPATIENT)
Dept: PHYSICAL MEDICINE AND REHAB | Facility: MEDICAL CENTER | Age: 40
End: 2025-03-11
Payer: COMMERCIAL

## 2025-03-11 ENCOUNTER — OCCUPATIONAL THERAPY (OUTPATIENT)
Dept: OCCUPATIONAL THERAPY | Facility: REHABILITATION | Age: 40
End: 2025-03-11
Attending: STUDENT IN AN ORGANIZED HEALTH CARE EDUCATION/TRAINING PROGRAM
Payer: COMMERCIAL

## 2025-03-11 VITALS
HEART RATE: 85 BPM | DIASTOLIC BLOOD PRESSURE: 86 MMHG | SYSTOLIC BLOOD PRESSURE: 140 MMHG | OXYGEN SATURATION: 96 % | BODY MASS INDEX: 37.29 KG/M2 | HEIGHT: 61 IN | TEMPERATURE: 99.6 F | WEIGHT: 197.53 LBS

## 2025-03-11 DIAGNOSIS — M77.8 EXTENSOR CARPI ULNARIS TENDINITIS: Primary | ICD-10-CM

## 2025-03-11 DIAGNOSIS — M25.531 RIGHT WRIST PAIN: ICD-10-CM

## 2025-03-11 DIAGNOSIS — M77.8 EXTENSOR CARPI ULNARIS TENDINITIS: ICD-10-CM

## 2025-03-11 PROCEDURE — 3079F DIAST BP 80-89 MM HG: CPT | Performed by: STUDENT IN AN ORGANIZED HEALTH CARE EDUCATION/TRAINING PROGRAM

## 2025-03-11 PROCEDURE — 3077F SYST BP >= 140 MM HG: CPT | Performed by: STUDENT IN AN ORGANIZED HEALTH CARE EDUCATION/TRAINING PROGRAM

## 2025-03-11 PROCEDURE — 99213 OFFICE O/P EST LOW 20 MIN: CPT | Performed by: STUDENT IN AN ORGANIZED HEALTH CARE EDUCATION/TRAINING PROGRAM

## 2025-03-11 PROCEDURE — 97110 THERAPEUTIC EXERCISES: CPT

## 2025-03-11 PROCEDURE — 1126F AMNT PAIN NOTED NONE PRSNT: CPT | Performed by: STUDENT IN AN ORGANIZED HEALTH CARE EDUCATION/TRAINING PROGRAM

## 2025-03-11 ASSESSMENT — PATIENT HEALTH QUESTIONNAIRE - PHQ9
SUM OF ALL RESPONSES TO PHQ QUESTIONS 1-9: 6
5. POOR APPETITE OR OVEREATING: 1 - SEVERAL DAYS
CLINICAL INTERPRETATION OF PHQ2 SCORE: 3

## 2025-03-11 ASSESSMENT — PAIN SCALES - GENERAL: PAINLEVEL_OUTOF10: NO PAIN

## 2025-03-11 ASSESSMENT — FIBROSIS 4 INDEX: FIB4 SCORE: 0.37

## 2025-03-11 NOTE — OP THERAPY DISCHARGE SUMMARY
Outpatient Occupational Therapy  DISCHARGE SUMMARY NOTE    [facility] Occupational Therapy [place] [address]  [address]  [place] [address]  Phone:  [phone]  Fax:  [phone]    Date of Visit: 03/11/2025    Patient: Hannah Winters  YOB: 1985  MRN: [id number]     Referring Provider: Estelita Rubio M.D.  [address]  [address]  [place] [address]   Referring Diagnosis: Other enthesopathies, not elsewhere classified [M77.8]         Functional Assessment Used        Your patient is being discharged from Occupational Therapy with the following comments:   Goals met    Comments:  Patient seen for initial evaluation and one follow up with a set HEP to complete at home and tendonitis has resolved     Limitations Remaining:  Nothing    Recommendations:  Continue with HEP and addressing ergonomics in the work place to minimize aggravation of symptoms.      TERENCE Flowers/TY    Date: 3/11/2025

## 2025-03-17 ENCOUNTER — APPOINTMENT (OUTPATIENT)
Dept: OCCUPATIONAL THERAPY | Facility: REHABILITATION | Age: 40
End: 2025-03-17
Attending: STUDENT IN AN ORGANIZED HEALTH CARE EDUCATION/TRAINING PROGRAM
Payer: COMMERCIAL

## 2025-03-18 ENCOUNTER — APPOINTMENT (OUTPATIENT)
Dept: MEDICAL GROUP | Facility: CLINIC | Age: 40
End: 2025-03-18
Payer: COMMERCIAL

## 2025-03-20 ENCOUNTER — OFFICE VISIT (OUTPATIENT)
Dept: MEDICAL GROUP | Facility: CLINIC | Age: 40
End: 2025-03-20
Payer: COMMERCIAL

## 2025-03-20 VITALS
HEIGHT: 61 IN | HEART RATE: 79 BPM | SYSTOLIC BLOOD PRESSURE: 109 MMHG | DIASTOLIC BLOOD PRESSURE: 71 MMHG | BODY MASS INDEX: 37 KG/M2 | WEIGHT: 196 LBS | OXYGEN SATURATION: 95 %

## 2025-03-20 DIAGNOSIS — G47.01 INSOMNIA DUE TO MEDICAL CONDITION: ICD-10-CM

## 2025-03-20 DIAGNOSIS — E66.812 CLASS 2 OBESITY DUE TO EXCESS CALORIES WITHOUT SERIOUS COMORBIDITY WITH BODY MASS INDEX (BMI) OF 37.0 TO 37.9 IN ADULT: ICD-10-CM

## 2025-03-20 DIAGNOSIS — E66.09 CLASS 2 OBESITY DUE TO EXCESS CALORIES WITHOUT SERIOUS COMORBIDITY WITH BODY MASS INDEX (BMI) OF 37.0 TO 37.9 IN ADULT: ICD-10-CM

## 2025-03-20 PROCEDURE — 3078F DIAST BP <80 MM HG: CPT | Performed by: STUDENT IN AN ORGANIZED HEALTH CARE EDUCATION/TRAINING PROGRAM

## 2025-03-20 PROCEDURE — 3074F SYST BP LT 130 MM HG: CPT | Performed by: STUDENT IN AN ORGANIZED HEALTH CARE EDUCATION/TRAINING PROGRAM

## 2025-03-20 PROCEDURE — 99213 OFFICE O/P EST LOW 20 MIN: CPT | Performed by: STUDENT IN AN ORGANIZED HEALTH CARE EDUCATION/TRAINING PROGRAM

## 2025-03-20 ASSESSMENT — FIBROSIS 4 INDEX: FIB4 SCORE: 0.37

## 2025-03-20 NOTE — ASSESSMENT & PLAN NOTE
Extensive counseling on lifestyle recommendations and methods to start making minor changes to diet and activity  Offered dietician/nutrition referral, she declines  Discussed labwork to check first - she is open to checking labs, requesting specifically to have cortisol as a part of this.  Also discussed sleep study as below, as any sleep apnea could also interfere with ability to lose weight    Advised we likely will not be able to get any weight loss medications covered by insurance, I highly recommend working on lifestyle interventions and that medications will not help with weight loss if she does not make lifestyle changes as well  Discussed lifestyle med clinics through our clinic, she is interested in these, advised scheduling with one of these visits for further education and assistance with lifestyle changes    Orders:    Comp Metabolic Panel; Future    Lipid Profile; Future    CBC WITH DIFFERENTIAL; Future    HEMOGLOBIN A1C; Future    TSH WITH REFLEX TO FT4; Future    CORTISOL - AM

## 2025-03-20 NOTE — PROGRESS NOTES
"Subjective:     CC:   Chief Complaint   Patient presents with    Weight Loss     Discuss weight loss options         HPI:   Hannah presents today with    Problem   Insomnia Due to Medical Condition   Obesity    Wants to discuss weight - concerned about difficulties losing weight. She reports she is physically active at work, but does not exercise outside of work.  She does not eat any consistent meals.  Usually has one large meal a day with continuous snacking throughout the day.  Primarily foods at home are processed foods, when she is at work eats cheese and crackers, celery, pretzels and yogurt.    Not interested in seeing dietician.  Has done this in the past and states she does not and won't eat the foods they recommended.  Wants to pursue medications, not interested in lifestyle interventions.  Notes that she was previously prescribed ozempic 2 years ago, but never heard back if this was approved by insurance           ROS:  Per HPI, otherwise negative    Objective:     Exam:  /71 (BP Location: Left arm, Patient Position: Sitting)   Pulse 79   Ht 1.549 m (5' 1\")   Wt 88.9 kg (196 lb)   SpO2 95%   BMI 37.03 kg/m²  Body mass index is 37.03 kg/m².    Physical Exam  Constitutional:       Appearance: Normal appearance. She is obese.   HENT:      Head: Normocephalic and atraumatic.      Nose: Nose normal.   Eyes:      Extraocular Movements: Extraocular movements intact.      Conjunctiva/sclera: Conjunctivae normal.   Pulmonary:      Effort: Pulmonary effort is normal. No respiratory distress.   Musculoskeletal:         General: Normal range of motion.      Cervical back: Normal range of motion and neck supple.   Skin:     General: Skin is warm and dry.   Neurological:      Mental Status: She is alert.             Assessment & Plan:     39 y.o. female with the following -     Assessment & Plan  Class 2 obesity due to excess calories without serious comorbidity with body mass index (BMI) of 37.0 to 37.9 in " adult  Extensive counseling on lifestyle recommendations and methods to start making minor changes to diet and activity  Offered dietician/nutrition referral, she declines  Discussed labwork to check first - she is open to checking labs, requesting specifically to have cortisol as a part of this.  Also discussed sleep study as below, as any sleep apnea could also interfere with ability to lose weight    Advised we likely will not be able to get any weight loss medications covered by insurance, I highly recommend working on lifestyle interventions and that medications will not help with weight loss if she does not make lifestyle changes as well  Discussed lifestyle med clinics through our clinic, she is interested in these, advised scheduling with one of these visits for further education and assistance with lifestyle changes    Orders:    Comp Metabolic Panel; Future    Lipid Profile; Future    CBC WITH DIFFERENTIAL; Future    HEMOGLOBIN A1C; Future    TSH WITH REFLEX TO FT4; Future    CORTISOL - AM    Insomnia due to medical condition  Notes interrupted sleep, inability to sleep for more than 1-2 hours straight, no known apneic events, but does snore at times, reports daytime fatigue and tired even upon awakening on days off  STOPBANG 3  Referral for home sleep study  Advised managing potential sleep apnea should help with ability to lose weight as well  Orders:    Referral to Pulmonary and Sleep Medicine              Referral for genetic research was offered. Patient already participant.    I spent a total of 27 minutes with record review, exam, communication with the patient, communication with other providers, and documentation of this encounter.      Return in about 4 weeks (around 4/17/2025) for F/u Labs with PCP.

## 2025-03-21 NOTE — ASSESSMENT & PLAN NOTE
Notes interrupted sleep, inability to sleep for more than 1-2 hours straight, no known apneic events, but does snore at times, reports daytime fatigue and tired even upon awakening on days off  STOPBANG 3  Referral for home sleep study  Advised managing potential sleep apnea should help with ability to lose weight as well  Orders:    Referral to Pulmonary and Sleep Medicine

## 2025-03-24 ENCOUNTER — APPOINTMENT (OUTPATIENT)
Dept: OCCUPATIONAL THERAPY | Facility: REHABILITATION | Age: 40
End: 2025-03-24
Attending: STUDENT IN AN ORGANIZED HEALTH CARE EDUCATION/TRAINING PROGRAM
Payer: COMMERCIAL

## 2025-03-25 ENCOUNTER — OFFICE VISIT (OUTPATIENT)
Dept: URGENT CARE | Facility: PHYSICIAN GROUP | Age: 40
End: 2025-03-25
Payer: COMMERCIAL

## 2025-03-25 ENCOUNTER — HOSPITAL ENCOUNTER (OUTPATIENT)
Dept: LAB | Facility: MEDICAL CENTER | Age: 40
End: 2025-03-25
Attending: STUDENT IN AN ORGANIZED HEALTH CARE EDUCATION/TRAINING PROGRAM
Payer: COMMERCIAL

## 2025-03-25 VITALS
TEMPERATURE: 97.3 F | OXYGEN SATURATION: 98 % | RESPIRATION RATE: 16 BRPM | HEART RATE: 74 BPM | WEIGHT: 191 LBS | BODY MASS INDEX: 36.06 KG/M2 | DIASTOLIC BLOOD PRESSURE: 72 MMHG | SYSTOLIC BLOOD PRESSURE: 116 MMHG | HEIGHT: 61 IN

## 2025-03-25 DIAGNOSIS — Z72.0 TOBACCO USER: ICD-10-CM

## 2025-03-25 DIAGNOSIS — H60.501 ACUTE OTITIS EXTERNA OF RIGHT EAR, UNSPECIFIED TYPE: ICD-10-CM

## 2025-03-25 DIAGNOSIS — E66.812 CLASS 2 OBESITY DUE TO EXCESS CALORIES WITHOUT SERIOUS COMORBIDITY WITH BODY MASS INDEX (BMI) OF 37.0 TO 37.9 IN ADULT: ICD-10-CM

## 2025-03-25 DIAGNOSIS — E66.09 CLASS 2 OBESITY DUE TO EXCESS CALORIES WITHOUT SERIOUS COMORBIDITY WITH BODY MASS INDEX (BMI) OF 37.0 TO 37.9 IN ADULT: ICD-10-CM

## 2025-03-25 LAB
BASOPHILS # BLD AUTO: 0.5 % (ref 0–1.8)
BASOPHILS # BLD: 0.03 K/UL (ref 0–0.12)
EOSINOPHIL # BLD AUTO: 0.05 K/UL (ref 0–0.51)
EOSINOPHIL NFR BLD: 0.9 % (ref 0–6.9)
ERYTHROCYTE [DISTWIDTH] IN BLOOD BY AUTOMATED COUNT: 41.5 FL (ref 35.9–50)
EST. AVERAGE GLUCOSE BLD GHB EST-MCNC: 108 MG/DL
HBA1C MFR BLD: 5.4 % (ref 4–5.6)
HCT VFR BLD AUTO: 40.2 % (ref 37–47)
HGB BLD-MCNC: 13.2 G/DL (ref 12–16)
IMM GRANULOCYTES # BLD AUTO: 0.02 K/UL (ref 0–0.11)
IMM GRANULOCYTES NFR BLD AUTO: 0.4 % (ref 0–0.9)
LYMPHOCYTES # BLD AUTO: 1.89 K/UL (ref 1–4.8)
LYMPHOCYTES NFR BLD: 33.1 % (ref 22–41)
MCH RBC QN AUTO: 29.5 PG (ref 27–33)
MCHC RBC AUTO-ENTMCNC: 32.8 G/DL (ref 32.2–35.5)
MCV RBC AUTO: 89.9 FL (ref 81.4–97.8)
MONOCYTES # BLD AUTO: 0.4 K/UL (ref 0–0.85)
MONOCYTES NFR BLD AUTO: 7 % (ref 0–13.4)
NEUTROPHILS # BLD AUTO: 3.32 K/UL (ref 1.82–7.42)
NEUTROPHILS NFR BLD: 58.1 % (ref 44–72)
NRBC # BLD AUTO: 0 K/UL
NRBC BLD-RTO: 0 /100 WBC (ref 0–0.2)
PLATELET # BLD AUTO: 294 K/UL (ref 164–446)
PMV BLD AUTO: 10.1 FL (ref 9–12.9)
RBC # BLD AUTO: 4.47 M/UL (ref 4.2–5.4)
WBC # BLD AUTO: 5.7 K/UL (ref 4.8–10.8)

## 2025-03-25 PROCEDURE — 80061 LIPID PANEL: CPT

## 2025-03-25 PROCEDURE — 36415 COLL VENOUS BLD VENIPUNCTURE: CPT

## 2025-03-25 PROCEDURE — 85025 COMPLETE CBC W/AUTO DIFF WBC: CPT

## 2025-03-25 PROCEDURE — 99406 BEHAV CHNG SMOKING 3-10 MIN: CPT | Performed by: FAMILY MEDICINE

## 2025-03-25 PROCEDURE — 84443 ASSAY THYROID STIM HORMONE: CPT

## 2025-03-25 PROCEDURE — 3074F SYST BP LT 130 MM HG: CPT | Performed by: FAMILY MEDICINE

## 2025-03-25 PROCEDURE — 80053 COMPREHEN METABOLIC PANEL: CPT

## 2025-03-25 PROCEDURE — 83036 HEMOGLOBIN GLYCOSYLATED A1C: CPT

## 2025-03-25 PROCEDURE — 82533 TOTAL CORTISOL: CPT

## 2025-03-25 PROCEDURE — 99213 OFFICE O/P EST LOW 20 MIN: CPT | Mod: 25 | Performed by: FAMILY MEDICINE

## 2025-03-25 PROCEDURE — 3078F DIAST BP <80 MM HG: CPT | Performed by: FAMILY MEDICINE

## 2025-03-25 RX ORDER — NEOMYCIN SULFATE, POLYMYXIN B SULFATE AND HYDROCORTISONE 10; 3.5; 1 MG/ML; MG/ML; [USP'U]/ML
4 SUSPENSION/ DROPS AURICULAR (OTIC) 3 TIMES DAILY
Qty: 10 ML | Refills: 0 | Status: SHIPPED | OUTPATIENT
Start: 2025-03-25

## 2025-03-25 RX ORDER — IBUPROFEN 800 MG/1
800 TABLET, FILM COATED ORAL EVERY 8 HOURS
COMMUNITY
Start: 2025-02-26 | End: 2025-03-25

## 2025-03-25 RX ORDER — OXYCODONE AND ACETAMINOPHEN 5; 325 MG/1; MG/1
TABLET ORAL
COMMUNITY
Start: 2025-02-26 | End: 2025-03-25

## 2025-03-25 ASSESSMENT — FIBROSIS 4 INDEX: FIB4 SCORE: 0.37

## 2025-03-25 NOTE — PROGRESS NOTES
"  Subjective:      39 y.o. female presents to urgent care for cold symptoms that started Saturday.  She is experiencing right sided ear pain that radiates into her jaw. No fever, headache, body aches, or cough. She vapes tobacco products daily.  No history of asthma or COPD.  She is vaccinated against COVID.  No known sick contacts.    She denies any other questions or concerns at this time.    Current problem list, medication, and past medical/surgical history were reviewed in Epic.    ROS  See HPI     Objective:      /72   Pulse 74   Temp 36.3 °C (97.3 °F) (Temporal)   Resp 16   Ht 1.549 m (5' 1\")   Wt 86.6 kg (191 lb)   SpO2 98%   BMI 36.09 kg/m²     Physical Exam  Constitutional:       General: She is not in acute distress.     Appearance: She is not diaphoretic.   HENT:      Right Ear: Tympanic membrane, ear canal and external ear normal.      Left Ear: Tympanic membrane, ear canal and external ear normal.      Ears:      Comments: Pain with manipulation of right tragus.  No issues on the left.  Cardiovascular:      Rate and Rhythm: Normal rate and regular rhythm.      Heart sounds: Normal heart sounds.   Pulmonary:      Effort: Pulmonary effort is normal. No respiratory distress.      Breath sounds: Normal breath sounds.   Neurological:      Mental Status: She is alert.   Psychiatric:         Mood and Affect: Affect normal.         Judgment: Judgment normal.       Assessment/Plan:     1. Acute otitis externa of right ear, unspecified type  Prescription for pediatric has been sent.  Tylenol and ibuprofen as needed for symptomatic relief.  - neomycin-polymyxin-HC (PEDIOTIC HC) [id number] Suspension; Administer 4 Drops into affected ear(s) 3 times a day.  Dispense: 10 mL; Refill: 0    2. Tobacco user  4 minutes was spent in educating patient of health risks associated with tobacco, nicotine, and vaping. Verbalized understanding. She has not been successful in quitting in the past. Declines further " information or assistance at this time.     Instructed to return to Urgent Care or nearest Emergency Department if symptoms fail to improve, for any change in condition, further concerns, or new concerning symptoms. Patient states understanding of the plan of care and discharge instructions.    Romina Pierre M.D.

## 2025-03-26 ENCOUNTER — RESULTS FOLLOW-UP (OUTPATIENT)
Dept: HOSPITALIST | Facility: MEDICAL CENTER | Age: 40
End: 2025-03-26

## 2025-03-26 LAB
ALBUMIN SERPL BCP-MCNC: 4.2 G/DL (ref 3.2–4.9)
ALBUMIN/GLOB SERPL: 1.4 G/DL
ALP SERPL-CCNC: 85 U/L (ref 30–99)
ALT SERPL-CCNC: 18 U/L (ref 2–50)
ANION GAP SERPL CALC-SCNC: 10 MMOL/L (ref 7–16)
AST SERPL-CCNC: 18 U/L (ref 12–45)
BILIRUB SERPL-MCNC: 0.3 MG/DL (ref 0.1–1.5)
BUN SERPL-MCNC: 7 MG/DL (ref 8–22)
CALCIUM ALBUM COR SERPL-MCNC: 9 MG/DL (ref 8.5–10.5)
CALCIUM SERPL-MCNC: 9.2 MG/DL (ref 8.5–10.5)
CHLORIDE SERPL-SCNC: 108 MMOL/L (ref 96–112)
CHOLEST SERPL-MCNC: 140 MG/DL (ref 100–199)
CO2 SERPL-SCNC: 23 MMOL/L (ref 20–33)
CORTIS SERPL-MCNC: 5.9 UG/DL (ref 0–23)
CREAT SERPL-MCNC: 0.73 MG/DL (ref 0.5–1.4)
FASTING STATUS PATIENT QL REPORTED: NORMAL
GFR SERPLBLD CREATININE-BSD FMLA CKD-EPI: 107 ML/MIN/1.73 M 2
GLOBULIN SER CALC-MCNC: 3.1 G/DL (ref 1.9–3.5)
GLUCOSE SERPL-MCNC: 92 MG/DL (ref 65–99)
HDLC SERPL-MCNC: 39 MG/DL
LDLC SERPL CALC-MCNC: 81 MG/DL
POTASSIUM SERPL-SCNC: 4 MMOL/L (ref 3.6–5.5)
PROT SERPL-MCNC: 7.3 G/DL (ref 6–8.2)
SODIUM SERPL-SCNC: 141 MMOL/L (ref 135–145)
TRIGL SERPL-MCNC: 102 MG/DL (ref 0–149)
TSH SERPL DL<=0.005 MIU/L-ACNC: 0.94 UIU/ML (ref 0.38–5.33)

## 2025-03-26 NOTE — Clinical Note
REFERRAL APPROVAL NOTICE         Sent on March 25, 2025                   Hannah Winters  [address]  [place] [address]                   Dear Ms. Winters,    After a careful review of the medical information and benefit coverage, [facility] has processed your referral. See below for additional details.    If applicable, you must be actively enrolled with your insurance for coverage of the authorized service. If you have any questions regarding your coverage, please contact your insurance directly.    REFERRAL INFORMATION   Referral #:  [id number]  Referred-To Department    Referred-By Provider:  Pulmonary and Sleep Medicine    Dhiraj Mata M.D.   Pulmonary/sleep [facility]      [address]  [place] [address] [phone] [address], [address]  [place] [address] [phone]    Referral Start Date:  03/20/2025  Referral End Date:   03/20/2026           SCHEDULING  If you do not already have an appointment, please call [phone] to make an appointment.   MORE INFORMATION  As a reminder, [facility] ownership has changed, meaning this location is now owned and operated by [facility]. As such, we want to clarify that our patients should expect to receive two separate bills for the services received at [facility] - one representing the [facility] facility fees as the owner of the establishment, and the other to represent the physician's services and subsequent fees. You can speak with your insurance carrier for a pricing estimate by calling the customer service number on the back of your card and ask about charges for a hospital outpatient visit.  If you do not already have a [employer] account, sign up at: [email or web address]  You can access your medical information, make appointments, see lab results, billing  information, and more.  If you have questions regarding this referral, please contact  the [facility] department at:             [phone]. Monday - Friday 7:30AM - 5:00PM.      Sincerely,  [facility]

## 2025-03-31 ENCOUNTER — APPOINTMENT (OUTPATIENT)
Dept: OCCUPATIONAL THERAPY | Facility: REHABILITATION | Age: 40
End: 2025-03-31
Attending: STUDENT IN AN ORGANIZED HEALTH CARE EDUCATION/TRAINING PROGRAM
Payer: COMMERCIAL

## 2025-04-03 ENCOUNTER — OFFICE VISIT (OUTPATIENT)
Dept: URGENT CARE | Facility: PHYSICIAN GROUP | Age: 40
End: 2025-04-03
Payer: COMMERCIAL

## 2025-04-03 VITALS
TEMPERATURE: 97.6 F | SYSTOLIC BLOOD PRESSURE: 124 MMHG | DIASTOLIC BLOOD PRESSURE: 68 MMHG | BODY MASS INDEX: 36.59 KG/M2 | OXYGEN SATURATION: 97 % | HEART RATE: 71 BPM | RESPIRATION RATE: 20 BRPM | WEIGHT: 193.8 LBS | HEIGHT: 61 IN

## 2025-04-03 DIAGNOSIS — H66.001 NON-RECURRENT ACUTE SUPPURATIVE OTITIS MEDIA OF RIGHT EAR WITHOUT SPONTANEOUS RUPTURE OF TYMPANIC MEMBRANE: ICD-10-CM

## 2025-04-03 PROCEDURE — 99213 OFFICE O/P EST LOW 20 MIN: CPT | Performed by: NURSE PRACTITIONER

## 2025-04-03 PROCEDURE — 3074F SYST BP LT 130 MM HG: CPT | Performed by: NURSE PRACTITIONER

## 2025-04-03 PROCEDURE — 3078F DIAST BP <80 MM HG: CPT | Performed by: NURSE PRACTITIONER

## 2025-04-03 RX ORDER — AMOXICILLIN 875 MG/1
875 TABLET, COATED ORAL 2 TIMES DAILY
Qty: 14 TABLET | Refills: 0 | Status: SHIPPED | OUTPATIENT
Start: 2025-04-03 | End: 2025-04-10

## 2025-04-03 ASSESSMENT — FIBROSIS 4 INDEX: FIB4 SCORE: 0.55

## 2025-04-05 NOTE — PROGRESS NOTES
Verbal consent was acquired by the patient to use [email or web address] ambient listening note generation during this visit          Chief Complaint   Patient presents with    Otalgia     Pt was seen 1 weeks ago for right ear pain sts the ear drops havent been helping           History of Present Illness  The patient is a 39-year-old female who presents for evaluation of right ear pain.    She was previously evaluated approximately 1.5 weeks ago for persistent discomfort in her right ear, which has been ongoing for 2.5 weeks. The initial diagnosis was an infection of the external auditory canal, and she was prescribed otic drops. Despite using the drops, she reports no improvement in her symptoms. She describes the sensation as a feeling of tightness rather than pain. Her occupation involves working in a noisy environment, and although she uses earplugs, she also listens to music at high volumes. She recalls the onset of her symptoms being associated with jaw pain, which has since resolved, but the ear discomfort persists. She has a scheduled appointment with another physician in [place] on 04/11/2025.         ROS:    No severe shortness of breath   No cardiac like chest pain, as discussed   As otherwise stated in HPI    Medical/SX/ Social History:  Reviewed per chart    Pertinent Medications:    Current Outpatient Medications on File Prior to Visit   Medication Sig Dispense Refill    neomycin-polymyxin-HC (PEDIOTIC HC) 3.5-22490-6 Suspension Administer 4 Drops into affected ear(s) 3 times a day. 10 mL 0    cyclobenzaprine (FLEXERIL) 10 mg Tab Take one tab as needed nightly for muscle spasms 15 Tablet 0    celecoxib (CELEBREX) 100 MG Cap Take 1 Capsule by mouth 2 times a day. (Patient not taking: Reported on 4/3/2025) 60 Capsule 1     No current facility-administered medications on file prior to visit.        Allergies:    Patient has no known allergies.     Problem list, medications, and allergies reviewed by myself today in  Epic     Physical Exam:    Vitals:    04/03/25 1448   BP: 124/68   Pulse: 71   Resp: 20   Temp: 36.4 °C (97.6 °F)   SpO2: 97%             Physical Exam  Vitals and nursing note reviewed.   Constitutional:       General: She is not in acute distress.     Appearance: Normal appearance. She is not ill-appearing or toxic-appearing.   HENT:      Head: Normocephalic and atraumatic.      Right Ear: Tenderness present. A middle ear effusion is present. Tympanic membrane is erythematous and bulging.      Left Ear: Tympanic membrane, ear canal and external ear normal.      Nose: Nose normal.      Mouth/Throat:      Mouth: Mucous membranes are moist.      Pharynx: Oropharynx is clear.   Eyes:      Extraocular Movements: Extraocular movements intact.      Conjunctiva/sclera: Conjunctivae normal.      Pupils: Pupils are equal, round, and reactive to light.   Cardiovascular:      Rate and Rhythm: Normal rate.      Pulses: Normal pulses.   Pulmonary:      Effort: Pulmonary effort is normal.   Musculoskeletal:         General: Normal range of motion.      Cervical back: Normal range of motion.   Skin:     General: Skin is warm.      Capillary Refill: Capillary refill takes less than 2 seconds.   Neurological:      General: No focal deficit present.      Mental Status: She is alert and oriented to person, place, and time.        Medical Decision making and plan :  I personally reviewed prior external notes and test results pertinent to today's visit. Pt is clinically stable at today's acute urgent care visit.  Patient appears nontoxic with no acute distress noted. Appropriate for outpatient care at this time.    Pleasant 39 y.o. female presented clinic with:     Assessment & Plan  1. Right otitis media.  The patient reports persistent right ear pain and tightness for about 2.5 weeks, despite using prescribed ear drops. Examination reveals a dull eardrum without a light reflex, suggesting an eardrum infection rather than a canal  infection. Oral antibiotics are recommended. Amoxicillin will be taken twice daily for one week. Additionally, over-the-counter Claritin or Zyrtec and Flonase nasal spray are advised for two weeks to help alleviate any fluid buildup. If symptoms do not improve, further evaluation or referral to an ENT specialist may be necessary.      Shared decision-making was utilized with patient for treatment plan. Medication discussed included indication for use and the potential benefits and side effects. Education was provided regarding the aforementioned assessments.  Differential Diagnosis, natural history, and supportive care discussed. All of the patient's questions were answered to their satisfaction at the time of discharge. Patient was encouraged to monitor symptoms closely. Those signs and symptoms which would warrant concern and mandate seeking a higher level of service through the emergency department discussed at length.  Patient stated agreement and understanding of this plan of care.    Disposition:  Home in stable condition     Voice Recognition Disclaimer:  Portions of this document were created using voice recognition software and [employer] technology provided by [employer]. The software does have a chance of producing errors of grammar and possibly content. I have made every reasonable attempt to correct obvious errors, but there may be errors of grammar and possibly content that I did not discover before finalizing the  documentation.    BRETT Ureña.

## 2025-04-08 ENCOUNTER — APPOINTMENT (OUTPATIENT)
Dept: OCCUPATIONAL THERAPY | Facility: REHABILITATION | Age: 40
End: 2025-04-08
Attending: STUDENT IN AN ORGANIZED HEALTH CARE EDUCATION/TRAINING PROGRAM
Payer: COMMERCIAL

## 2025-04-10 NOTE — PROGRESS NOTES
Verbal consent was acquired by the patient to use [employer] ambient listening note generation during this visit Yes     FOLLOW-UP PATIENT VISIT    Interventional Spine and Pain  Physiatry (Physical Medicine and Rehabilitation)     Date of Service: 25   Chief Complaint:   Chief Complaint   Patient presents with    Follow-Up     Extensor carpi ulnaris tendinitis      Patient Name: Hannah Winters   : 1985   MRN: [id number]       PRIOR HISTORY    Please see new patient note by Dr. Rubio for more details.     Interval History  Patient identification: Hannah Winters,  1985, with history of depression who presents today for follow-up of right wrist pain and hand numbness.     History of Present Illness  The patient is a 39-year-old female who presents today for follow-up of right wrist pain.    She has been on light duty at work for the past month, which has resulted in minimal use of her wrist. She will continue on light duty for an additional week and a half, which she reports has been beneficial for her wrist pain. She reports no significant changes or new areas of concern. She has found some relief from physical therapy and continues to perform the recommended exercises. Her current pain level is approximately 3 out of 10, which has remained consistent.    Supplemental Information  She was diagnosed with an ovarian cyst on Monday night and was prescribed naproxen and another form of Tylenol.       Procedures:  24: EMG RUE, within normal limits.         PMHx:   History reviewed. No pertinent past medical history.    PSHx:   Past Surgical History:   Procedure Laterality Date    TUBAL LIGATION Bilateral 2025    PRIMARY C SECTION  2018    PRIMARY C SECTION  2006       Family history   Family History   Problem Relation Age of Onset    Breast Cancer Mother     Diabetes Father     No Known Problems Brother     No Known Problems Brother     No Known Problems Maternal  Grandmother     Cancer Maternal Grandfather     No Known Problems Paternal Grandmother     No Known Problems Paternal Grandfather     No Known Problems Daughter     No Known Problems Son        Medications:   Outpatient Medications Marked as Taking for the 4/11/25 encounter (Office Visit) with Estelita Rubio M.D.   Medication Sig Dispense Refill    naproxen (NAPROSYN) 500 MG Tab Take 500 mg by mouth 2 times a day as needed. PAIN      acetaminophen (TYLENOL) 325 MG Tab TAKE 1 TABLET BY MOUTH EVERY 4 HOURS AS NEEDED FOR PAIN      cyclobenzaprine (FLEXERIL) 10 mg Tab Take one tab as needed nightly for muscle spasms 15 Tablet 0        Current Outpatient Medications on File Prior to Visit   Medication Sig Dispense Refill    naproxen (NAPROSYN) 500 MG Tab Take 500 mg by mouth 2 times a day as needed. PAIN      acetaminophen (TYLENOL) 325 MG Tab TAKE 1 TABLET BY MOUTH EVERY 4 HOURS AS NEEDED FOR PAIN      cyclobenzaprine (FLEXERIL) 10 mg Tab Take one tab as needed nightly for muscle spasms 15 Tablet 0    neomycin-polymyxin-HC (PEDIOTIC HC) 3.5-15702-8 Suspension Administer 4 Drops into affected ear(s) 3 times a day. (Patient not taking: Reported on 4/11/2025) 10 mL 0     No current facility-administered medications on file prior to visit.       Allergies:   No Known Allergies    Social Hx:   Social History     Socioeconomic History    Marital status: Single     Spouse name: Not on file    Number of children: Not on file    Years of education: Not on file    Highest education level: 12th grade   Occupational History    Not on file   Tobacco Use    Smoking status: Former     Current packs/day: 0.50     Average packs/day: 0.5 packs/day for 6.0 years (3.0 ttl pk-yrs)     Types: Cigarettes    Smokeless tobacco: Never    Tobacco comments:     Pt sts 1 every once and awhile 11/28/22   Vaping Use    Vaping status: Every Day    Substances: Nicotine, Flavoring    Devices: Disposable, Refillable tank   Substance and Sexual Activity     Alcohol use: Not Currently    Drug use: Never    Sexual activity: Not Currently     Birth control/protection: Implant   Other Topics Concern    Not on file   Social History Narrative    Not on file     Social Drivers of Health     Financial Resource Strain: Low Risk  (11/25/2022)    Overall Financial Resource Strain (CARDIA)     Difficulty of Paying Living Expenses: Not hard at all   Food Insecurity: No Food Insecurity (11/25/2022)    Hunger Vital Sign     Worried About Running Out of Food in the Last Year: Never true     Ran Out of Food in the Last Year: Never true   Transportation Needs: No Transportation Needs (11/25/2022)    PRAPARE - Transportation     Lack of Transportation (Medical): No     Lack of Transportation (Non-Medical): No   Physical Activity: Sufficiently Active (11/25/2022)    Exercise Vital Sign     Days of Exercise per Week: 3 days     Minutes of Exercise per Session: 50 min   Stress: No Stress Concern Present (11/25/2022)    [employer] - Occupational Stress Questionnaire     Feeling of Stress : Only a little   Social Connections: Socially Isolated (11/25/2022)    Social Connection and Isolation Panel [NHANES]     Frequency of Communication with Friends and Family: More than three times a week     Frequency of Social Gatherings with Friends and Family: Patient declined     Attends Hoahaoism Services: Never     Active Member of Clubs or Organizations: No     Attends Club or Organization Meetings: Never     Marital Status: Never    Intimate Partner Violence: Not on file   Housing Stability: Low Risk  (11/25/2022)    Housing Stability Vital Sign     Unable to Pay for Housing in the Last Year: No     Number of Places Lived in the Last Year: 1     Unstable Housing in the Last Year: No         EXAMINATION     Physical Exam:   Vitals: /68 (BP Location: Left arm, Patient Position: Sitting, BP Cuff Size: Adult)   Pulse 79   Temp 37.1 °C (98.8 °F) (Temporal)   " Ht 1.549 m (5' 1\")   Wt 88.3 kg (194 lb 10.7 oz)   SpO2 99%     Constitutional:   Body Habitus: Body mass index is 36.78 kg/m².  Cooperation: Fully cooperates with exam  Appearance: Well-groomed, well-nourished  Respiratory:  Breathing comfortable on room air, no audible wheezing  Cardiovascular: Skin appears well-perfused  Psychiatric: Appropriate affect  Gait: normal gait, no use of ambulatory device, nonantalgic.     Neurologic:  Strength:    Bilateral UE 5/5 in shoulder abductors, elbow extensors and flexors, wrist extensors, finger abductors, and finger flexors   Sensation: grossly intact bilaterally dermatomes C5-T1      MEDICAL DECISION MAKING    DATA    Labs:   Lab Results   Component Value Date/Time    SODIUM 141 03/25/2025 10:55 AM    POTASSIUM 4.0 03/25/2025 10:55 AM    CHLORIDE 108 03/25/2025 10:55 AM    CO2 23 03/25/2025 10:55 AM    GLUCOSE 92 03/25/2025 10:55 AM    BUN 7 (L) 03/25/2025 10:55 AM    CREATININE 0.73 03/25/2025 10:55 AM        Lab Results   Component Value Date/Time    WBC 5.7 03/25/2025 10:55 AM    RBC 4.47 03/25/2025 10:55 AM    HEMOGLOBIN 13.2 03/25/2025 10:55 AM    HEMATOCRIT 40.2 03/25/2025 10:55 AM    MCV 89.9 03/25/2025 10:55 AM    MCH 29.5 03/25/2025 10:55 AM    MCHC 32.8 03/25/2025 10:55 AM    MPV 10.1 03/25/2025 10:55 AM    NEUTSPOLYS 58.10 03/25/2025 10:55 AM    LYMPHOCYTES 33.10 03/25/2025 10:55 AM    MONOCYTES 7.00 03/25/2025 10:55 AM    EOSINOPHILS 0.90 03/25/2025 10:55 AM    BASOPHILS 0.50 03/25/2025 10:55 AM        Lab Results   Component Value Date/Time    HBA1C 5.4 03/25/2025 10:55 AM          Imaging:   I reviewed the following radiology reports  MRI Right Wrist 1/13/2025:  FINDINGS:     There is a marker along the medial aspect of the wrist.     ALIGNMENT: Normal.     JOINTS:  Effusion: No effusion.  First carpometacarpal joint:No arthropathy detected.  Triscaphe joint:No arthropathy detected.  Radio-scaphoid articulation: Normal.     BONES:  No fractures or osseous " contusions.  No flexion deformity of the scaphoid.     Ligaments:  - Scapholunate ligament: Intact  - Lunotriquetral ligament: Intact  - Dorsal capsule: No synovitis. No ganglion cyst.  - Volar radiocarpal extrinsic ligament: Intact.  - Triangular fibrocartilage: Intact TFC disc. Normal foveal and ulnar styloid attachment.  - Distal radioulnar joint effusion: Trace fluid.        EXTENSOR COMPARTMENT:  - I (Abductor pollicis longus, extensor pollicis brevis): Normal  - II (Extensor carpi radialis brevis and longus): Normal  - III (Extensor pollicis longus): Normal  - IV (Extensor digitorum and indicis): Normal  - V (Extensor digiti minimi): Normal  - VI (Extensor carpi ulnaris): Mildly thickened     FLEXOR COMPARTMENT:  - Carpal tunnel: Normal.  - Flexor retinaculum: Intact.  - Flexor tendons: Normal  -Median nerve: Normal signal and appearance throughout the carpal tunnel.     [place] canal: Normal.     Muscle: Normal thenar and hyperthenar musculature.  __________________________________     IMPRESSION:        1. Mild tendinosis of the extensor carpi ulnaris.  2. No osteoarthritis. No ligamentous injury.      DIAGNOSIS   Visit Diagnoses     ICD-10-CM   1. Extensor carpi ulnaris tendinitis  M77.8   2. Right wrist pain  M25.531         ASSESSMENT and PLAN:     Hannah Winters is a 39 y.o. female who returns to clinic for follow-up of right wrist pain.    Hannah was seen today for follow-up.    Diagnoses and all orders for this visit:    Extensor carpi ulnaris tendinitis    Right wrist pain        Assessment & Plan  Right extensor carpi ulnaris tendinitis  Right wrist pain  The patient reports that her pain level is currently at a 3 out of 10 and has been consistent, an improvement which she thinks is due to being on light duty at work as well as physical therapy exercises. She is currently taking ibuprofen 800 mg as prescribed for ovarian cyst. The option of an injection into the wrist area was discussed, but it  was decided to avoid this option for now. A follow-up appointment will be scheduled after she has resumed full duty for a few weeks to assess her condition. If her her naproxen is depleted and she experiences a flare-up of her wrist pain, she is advised to contact the office for a prescription refill.        Follow up: In 4 weeks    Thank you for allowing me to participate in the care of this patient. If you have any questions please not hesitate to contact me.         Please note that this dictation was created using voice recognition software. I have made every reasonable attempt to correct obvious errors but there may be errors of grammar and content that I may have overlooked prior to finalization of this note.    Estelita Rubio MD  Interventional Spine and Sports Physiatry  Physical Medicine and Rehabilitation  [facility]

## 2025-04-11 ENCOUNTER — APPOINTMENT (OUTPATIENT)
Dept: MEDICAL GROUP | Facility: CLINIC | Age: 40
End: 2025-04-11
Payer: COMMERCIAL

## 2025-04-11 ENCOUNTER — APPOINTMENT (OUTPATIENT)
Dept: PHYSICAL MEDICINE AND REHAB | Facility: MEDICAL CENTER | Age: 40
End: 2025-04-11
Payer: COMMERCIAL

## 2025-04-11 VITALS
SYSTOLIC BLOOD PRESSURE: 110 MMHG | DIASTOLIC BLOOD PRESSURE: 64 MMHG | HEIGHT: 61 IN | WEIGHT: 192 LBS | BODY MASS INDEX: 36.25 KG/M2 | TEMPERATURE: 97 F | OXYGEN SATURATION: 97 % | RESPIRATION RATE: 16 BRPM | HEART RATE: 80 BPM

## 2025-04-11 VITALS
HEIGHT: 61 IN | OXYGEN SATURATION: 99 % | HEART RATE: 79 BPM | TEMPERATURE: 98.8 F | DIASTOLIC BLOOD PRESSURE: 68 MMHG | WEIGHT: 194.67 LBS | BODY MASS INDEX: 36.75 KG/M2 | SYSTOLIC BLOOD PRESSURE: 127 MMHG

## 2025-04-11 DIAGNOSIS — H69.91 DISORDER OF RIGHT EUSTACHIAN TUBE: ICD-10-CM

## 2025-04-11 DIAGNOSIS — M77.8 EXTENSOR CARPI ULNARIS TENDINITIS: Primary | ICD-10-CM

## 2025-04-11 DIAGNOSIS — E66.812 CLASS 2 OBESITY DUE TO EXCESS CALORIES WITHOUT SERIOUS COMORBIDITY WITH BODY MASS INDEX (BMI) OF 36.0 TO 36.9 IN ADULT: ICD-10-CM

## 2025-04-11 DIAGNOSIS — R53.83 OTHER FATIGUE: ICD-10-CM

## 2025-04-11 DIAGNOSIS — E66.09 CLASS 2 OBESITY DUE TO EXCESS CALORIES WITHOUT SERIOUS COMORBIDITY WITH BODY MASS INDEX (BMI) OF 36.0 TO 36.9 IN ADULT: ICD-10-CM

## 2025-04-11 DIAGNOSIS — M25.531 RIGHT WRIST PAIN: ICD-10-CM

## 2025-04-11 PROCEDURE — 3074F SYST BP LT 130 MM HG: CPT | Performed by: FAMILY MEDICINE

## 2025-04-11 PROCEDURE — 1125F AMNT PAIN NOTED PAIN PRSNT: CPT | Performed by: STUDENT IN AN ORGANIZED HEALTH CARE EDUCATION/TRAINING PROGRAM

## 2025-04-11 PROCEDURE — 99213 OFFICE O/P EST LOW 20 MIN: CPT | Performed by: STUDENT IN AN ORGANIZED HEALTH CARE EDUCATION/TRAINING PROGRAM

## 2025-04-11 PROCEDURE — 3074F SYST BP LT 130 MM HG: CPT | Performed by: STUDENT IN AN ORGANIZED HEALTH CARE EDUCATION/TRAINING PROGRAM

## 2025-04-11 PROCEDURE — 3078F DIAST BP <80 MM HG: CPT | Performed by: STUDENT IN AN ORGANIZED HEALTH CARE EDUCATION/TRAINING PROGRAM

## 2025-04-11 PROCEDURE — 99214 OFFICE O/P EST MOD 30 MIN: CPT | Performed by: FAMILY MEDICINE

## 2025-04-11 PROCEDURE — 3078F DIAST BP <80 MM HG: CPT | Performed by: FAMILY MEDICINE

## 2025-04-11 RX ORDER — FLUTICASONE PROPIONATE 50 MCG
1 SPRAY, SUSPENSION (ML) NASAL DAILY
Qty: 16 G | Refills: 1 | Status: SHIPPED | OUTPATIENT
Start: 2025-04-11

## 2025-04-11 RX ORDER — NAPROXEN 500 MG/1
500 TABLET ORAL 2 TIMES DAILY PRN
COMMUNITY
Start: 2025-04-08

## 2025-04-11 RX ORDER — ACETAMINOPHEN 325 MG/1
TABLET ORAL
COMMUNITY
Start: 2025-04-08

## 2025-04-11 RX ORDER — SEMAGLUTIDE 0.25 MG/.5ML
0.25 INJECTION, SOLUTION SUBCUTANEOUS
Qty: 2 ML | Refills: 3 | Status: SHIPPED | OUTPATIENT
Start: 2025-04-11

## 2025-04-11 RX ORDER — PHENTERMINE HYDROCHLORIDE 15 MG/1
15 CAPSULE ORAL EVERY MORNING
Qty: 30 CAPSULE | Refills: 0 | Status: SHIPPED | OUTPATIENT
Start: 2025-04-11 | End: 2025-05-11

## 2025-04-11 ASSESSMENT — PATIENT HEALTH QUESTIONNAIRE - PHQ9: CLINICAL INTERPRETATION OF PHQ2 SCORE: 0

## 2025-04-11 ASSESSMENT — FIBROSIS 4 INDEX
FIB4 SCORE: 0.55
FIB4 SCORE: 0.55

## 2025-04-11 ASSESSMENT — PAIN SCALES - GENERAL: PAINLEVEL_OUTOF10: 3=SLIGHT PAIN

## 2025-04-11 NOTE — ASSESSMENT & PLAN NOTE
See HPI for details  Patient describes muffled hearing in the right ear, ringing, and throbbing sensation.  Denies pain or fever.  Completed recent antibiotics for otitis media.  Suspect eustachian tube disorder.  Urgent care had recommended Flonase but not sent a prescription in.  Patient reports over-the-counter cost for Flonase was too high.  Trial sending in prescription for Flonase.  To see if insurance will cover.  Patient also mentions that her parents have some over-the-counter coverage so she might be able to access it that way.  Also showed her good Rx as additional cost saving tool.  If no improvement on Flonase for 2 to 4 weeks, will consider audiology.

## 2025-04-11 NOTE — PROGRESS NOTES
Subjective:     CC: Discuss weight medications and right ear concern    HPI:   Hannah presents today with:    Wants to discuss weight:  Exercises 3x/week, cardio (treadmill, incline, stair climbers), no resistance exercise  Also has an active job at a [employer]    Diet:  has seen nutritionist in the past, doesn't want to follow some of their recommendations  Caffeine: coke 16 ounces, 3 per day.  Doesn't like the taste of diet soda.  Eats one main meal per day (supper), eats a lot potatos  Lots of snacks: Carrots and broccoli, yogurt, white cheddar popcorn, fig cordero, and cheese crackers    Sleep study:   +snores, no witnessed apnea, +fatigue  Denies morning headaches  Avg 3-4 hrs sleep at night.    Works night shift, gets off at 7am, falls asleep around 11am, wakes around 3pm.    No hx pancreatitis, thyroid cancer or gastroparesis  Stools every other day    Weight history:  After she gave birth to her second child in 2018, gained 70 lbs and lost only about 10 lbs after delivery  Weight has been stable for the most part since then  Lost 30 lbs when she worked at [employer], when she got laid off she gained it back.    Mood is stable  Denies disordered eating hx or compulsive eating    No supplements  Weight-associated co-morbidities: knee pain    Right ear concerns:  Has had two urgent care visits in the past few weeks  Sxs started a little over a month ago, started with jaw pain and then moved into ear  Completed antibiotic ear drops, which didn't help  Her jaw pain has since resolved  Went back, got oral antibiotics, still no improvement  Describes some muffled hearing on the R and hears a throbbing sensation  Reports urgent care advised taking flonase but she couldn't afford it    Current Outpatient Medications Ordered in Epic   Medication Sig Dispense Refill    naproxen (NAPROSYN) 500 MG Tab Take 500 mg by mouth 2 times a day as needed. PAIN      acetaminophen (TYLENOL) 325 MG Tab TAKE 1 TABLET BY MOUTH EVERY 4  "HOURS AS NEEDED FOR PAIN      phentermine 15 MG capsule Take 1 Capsule by mouth every morning for 30 days. 30 Capsule 0    fluticasone (FLONASE ALLERGY RELIEF) 50 MCG/ACT nasal spray Administer 1 Spray into affected nostril(S) every day. 16 g 1    Semaglutide (WEGOVY) 0.25 MG/0.5ML Solution Auto-injector Pen-injector Inject 0.5 mL under the skin every 7 days. 2 mL 3    cyclobenzaprine (FLEXERIL) 10 mg Tab Take one tab as needed nightly for muscle spasms 15 Tablet 0    neomycin-polymyxin-HC (PEDIOTIC HC) 3.5-61624-8 Suspension Administer 4 Drops into affected ear(s) 3 times a day. (Patient not taking: Reported on 4/11/2025) 10 mL 0     No current Epic-ordered facility-administered medications on file.       History reviewed. No pertinent past medical history.     Past Surgical History:   Procedure Laterality Date    TUBAL LIGATION Bilateral 02/26/2025    PRIMARY C SECTION  08/2018    PRIMARY C SECTION  12/2006        Family History   Problem Relation Age of Onset    Breast Cancer Mother     Diabetes Father     No Known Problems Brother     No Known Problems Brother     No Known Problems Maternal Grandmother     Cancer Maternal Grandfather     No Known Problems Paternal Grandmother     No Known Problems Paternal Grandfather     No Known Problems Daughter     No Known Problems Son         ROS:  +Muffled hearing  Denies otalgia, fever  Denies weight loss  +knee pain  Denies constipation or significant heartburn  +daytime fatigue    Objective:     Exam:  /64 (BP Location: Right arm, Patient Position: Sitting, BP Cuff Size: Adult)   Pulse 80   Temp 36.1 °C (97 °F) (Temporal)   Resp 16   Ht 1.549 m (5' 1\")   Wt 87.1 kg (192 lb)   SpO2 97%   BMI 36.28 kg/m²  Body mass index is 36.28 kg/m².    Gen: Alert and oriented, No apparent distress.  Head:  NCAT, EOMI, sclera clear without discharge  Neck: Neck is supple without lymphadenopathy.  Lungs: Normal effort, CTA bilaterally, no wheezes, rhonchi, or " rales  CV: Regular rate and rhythm. No murmurs, rubs, or gallops.  Abd:   Non-distended, soft  Ext: No clubbing, cyanosis, edema.  MSK: Unassisted gait  Derm: No lesions on exposed skin  Psych: normal mood and affect        Assessment & Plan:     39 y.o. female with the following -     Problem List Items Addressed This Visit       Class 2 obesity due to excess calories without serious comorbidity with body mass index (BMI) of 36.0 to 36.9 in adult    Chronic condition, class II obesity  Reviewed recent labs.  No associated diabetes, hyperlipidemia, or abnormal liver labs.  Blood pressure is normal.    Has room to improve with lifestyle measures especially in terms of nutrition and sleep  Discussed several goals including decrease soda intake.  If we end up adding topiramate this might help if she experiences dysgeusia with soda.  Discussed sleep, ordered home sleep study.  Has appointment with sleep medicine at the end of the year  Encouraged several sleep interventions including no screens when she gets home from work at 7 AM.  Try to drink her caffeine and/or sugar earlier in her nighttime shift.  And could consider very low-dose melatonin to help her get to bed earlier and increase the number of hours of sleep.  She is worried about oversleeping through her alarm clock.  Discussed that she could try this for the first time on a day where she did not have to go to work the following day.    Sent Rx for semaglutide for FDA approved indication of class 2 obesity, BMI 36, no contraindications.  Discussed phentermine if semaglutide is not covered.  Reviewed medication side effects, risks and benefits.  No contraindications.  VS WNL.  CS agreement completed  Rx phentermine #30, no refills  Follow-up in 1 month to reassess           Relevant Medications    phentermine 15 MG capsule    Semaglutide (WEGOVY) 0.25 MG/0.5ML Solution Auto-injector Pen-injector    Other Relevant Orders    Controlled Substance Treatment  Agreement    Disorder of right eustachian tube    See HPI for details  Patient describes muffled hearing in the right ear, ringing, and throbbing sensation.  Denies pain or fever.  Completed recent antibiotics for otitis media.  Suspect eustachian tube disorder.  Urgent care had recommended Flonase but not sent a prescription in.  Patient reports over-the-counter cost for Flonase was too high.  Trial sending in prescription for Flonase.  To see if insurance will cover.  Patient also mentions that her parents have some over-the-counter coverage so she might be able to access it that way.  Also showed her good Rx as additional cost saving tool.  If no improvement on Flonase for 2 to 4 weeks, will consider audiology.         Relevant Medications    fluticasone (FLONASE ALLERGY RELIEF) 50 MCG/ACT nasal spray    Other fatigue    STOP BAN         Relevant Orders    Overnight Home Sleep Study       Follow-up: 1 month

## 2025-04-11 NOTE — ASSESSMENT & PLAN NOTE
Chronic condition, class II obesity  Reviewed recent labs.  No associated diabetes, hyperlipidemia, or abnormal liver labs.  Blood pressure is normal.    Has room to improve with lifestyle measures especially in terms of nutrition and sleep  Discussed several goals including decrease soda intake.  If we end up adding topiramate this might help if she experiences dysgeusia with soda.  Discussed sleep, ordered home sleep study.  Has appointment with sleep medicine at the end of the year  Encouraged several sleep interventions including no screens when she gets home from work at 7 AM.  Try to drink her caffeine and/or sugar earlier in her nighttime shift.  And could consider very low-dose melatonin to help her get to bed earlier and increase the number of hours of sleep.  She is worried about oversleeping through her alarm clock.  Discussed that she could try this for the first time on a day where she did not have to go to work the following day.    Sent Rx for semaglutide for FDA approved indication of class 2 obesity, BMI 36, no contraindications.  Discussed phentermine if semaglutide is not covered.  Reviewed medication side effects, risks and benefits.  No contraindications.  VS WNL.  CS agreement completed  Rx phentermine #30, no refills  Follow-up in 1 month to reassess

## 2025-04-17 PROBLEM — R53.83 OTHER FATIGUE: Status: ACTIVE | Noted: 2025-04-17

## 2025-04-18 DIAGNOSIS — H69.91 DISORDER OF RIGHT EUSTACHIAN TUBE: ICD-10-CM

## 2025-04-18 DIAGNOSIS — H92.01 RIGHT EAR PAIN: ICD-10-CM

## 2025-04-21 RX ORDER — CYCLOBENZAPRINE HCL 10 MG
TABLET ORAL
Qty: 15 TABLET | Refills: 0 | Status: SHIPPED | OUTPATIENT
Start: 2025-04-21

## 2025-04-22 NOTE — TELEPHONE ENCOUNTER
Received request via: Pharmacy    Was the patient seen in the last year in this department? Yes    Does the patient have an active prescription (recently filled or refills available) for medication(s) requested? No    Pharmacy Name: [facility] [address] [place] [address]      Does the patient have snf Plus and need 100-day supply? (This applies to ALL medications) Patient does not have SCP

## 2025-05-06 NOTE — Clinical Note
REFERRAL APPROVAL NOTICE         Sent on May 6, 2025                   Hannah Winters  [address]  [place] [address]                   Dear Ms. Winters,    After a careful review of the medical information and benefit coverage, [facility] has processed your referral. See below for additional details.    If applicable, you must be actively enrolled with your insurance for coverage of the authorized service. If you have any questions regarding your coverage, please contact your insurance directly.    REFERRAL INFORMATION   Referral #:  [id number]  Referred-To Department    Referred-By Provider:  Pulmonary and Sleep Medicine    Laya Barahona M.D.   [facility]      [address]  [place] [address]  [phone] [address] [facility]  [address]  [place] [id number] [phone]    Referral Start Date:  04/11/2025  Referral End Date:   04/11/2026             SCHEDULING  If you do not already have an appointment, please call [phone] to make an appointment.     MORE INFORMATION  If you do not already have a [employer] account, sign up at: [email or web address]  You can access your medical information, make appointments, see lab results, billing information, and more.  If you have questions regarding this referral, please contact  the [facility] Referrals department at:             [phone]. Monday - Friday 8:00AM - 5:00PM.     Sincerely,    [facility]

## 2025-05-06 NOTE — Clinical Note
REFERRAL APPROVAL NOTICE         Sent on May 6, 2025                   Hannah Winters  [address]  [place] [address]                   Dear Ms. Winters,    After a careful review of the medical information and benefit coverage, [facility] has processed your referral. See below for additional details.    If applicable, you must be actively enrolled with your insurance for coverage of the authorized service. If you have any questions regarding your coverage, please contact your insurance directly.    REFERRAL INFORMATION   Referral #:  [id number]  Referred-To Provider    Referred-By Provider:  Otolaryngology    DOMINIC Ray LEAH J      [address]   [place] [address]  [phone] [address] [facility] [place] [address]  [phone]    Referral Start Date:  04/18/2025  Referral End Date:   04/18/2026             SCHEDULING  If you do not already have an appointment, please call [phone] to make an appointment.     MORE INFORMATION  If you do not already have a [employer] account, sign up at: [email or web address]  You can access your medical information, make appointments, see lab results, billing information, and more.  If you have questions regarding this referral, please contact  the [facility] Referrals department at:             [phone]. Monday - Friday 8:00AM - 5:00PM.     Sincerely,    [facility]

## 2025-05-09 ENCOUNTER — TELEPHONE (OUTPATIENT)
Dept: HEALTH INFORMATION MANAGEMENT | Facility: OTHER | Age: 40
End: 2025-05-09

## 2025-05-09 NOTE — PROGRESS NOTES
Verbal consent was acquired by the patient to use walkby ambient listening note generation during this visit Yes     FOLLOW-UP PATIENT VISIT    Interventional Spine and Pain  Physiatry (Physical Medicine and Rehabilitation)     Date of Service: 25   Chief Complaint: Right wrist pain     Patient Name: Hannah Winters   : 1985   MRN: [id number]       PRIOR HISTORY    Please see new patient note by Dr. Rubio for more details.     Interval History  Patient identification: Hannah Winters,  1985, with history of depression who presents today for follow-up of right wrist pain and hand numbness.     History of Present Illness  The patient is a 39-year-old female who presents today for follow-up of right wrist pain. She reports an exacerbation of her right wrist pain this morning, which she attributes to a recent incident of improper flipping and the temperature changes in the last few days. Despite this, she has been able to manage the pain effectively with Tylenol, even after resuming work, and other this morning pain has been well-controlled and she denies any new symptoms. She also notes that her symptoms tend to worsen during periods of fluctuating weather conditions. To alleviate the pain, she has been combining Tylenol with ibuprofen.      Procedures:  24: EMG RUE, within normal limits.      PMHx:   History reviewed. No pertinent past medical history.    PSHx:   Past Surgical History:   Procedure Laterality Date    TUBAL LIGATION Bilateral 2025    PRIMARY C SECTION  2018    PRIMARY C SECTION  2006       Family history   Family History   Problem Relation Age of Onset    Breast Cancer Mother     Diabetes Father     No Known Problems Brother     No Known Problems Brother     No Known Problems Maternal Grandmother     Cancer Maternal Grandfather     No Known Problems Paternal Grandmother     No Known Problems Paternal Grandfather     No Known Problems Daughter      No Known Problems Son        Medications:   No outpatient medications have been marked as taking for the 5/13/25 encounter (Office Visit) with Estelita Rubio M.D..        Current Outpatient Medications on File Prior to Visit   Medication Sig Dispense Refill    cyclobenzaprine (FLEXERIL) 10 MG Tab Take one tab as needed nightly for muscle spasms 15 Tablet 0    naproxen (NAPROSYN) 500 MG Tab Take 500 mg by mouth 2 times a day as needed. PAIN      acetaminophen (TYLENOL) 325 MG Tab TAKE 1 TABLET BY MOUTH EVERY 4 HOURS AS NEEDED FOR PAIN      fluticasone (FLONASE ALLERGY RELIEF) 50 MCG/ACT nasal spray Administer 1 Spray into affected nostril(S) every day. 16 g 1    Semaglutide (WEGOVY) 0.25 MG/0.5ML Solution Auto-injector Pen-injector Inject 0.5 mL under the skin every 7 days. 2 mL 3    neomycin-polymyxin-HC (PEDIOTIC HC) 3.5-51650-0 Suspension Administer 4 Drops into affected ear(s) 3 times a day. (Patient not taking: Reported on 4/11/2025) 10 mL 0     No current facility-administered medications on file prior to visit.       Allergies:   No Known Allergies    Social Hx:   Social History     Socioeconomic History    Marital status: Single     Spouse name: Not on file    Number of children: Not on file    Years of education: Not on file    Highest education level: 12th grade   Occupational History    Not on file   Tobacco Use    Smoking status: Former     Current packs/day: 0.50     Average packs/day: 0.5 packs/day for 6.0 years (3.0 ttl pk-yrs)     Types: Cigarettes    Smokeless tobacco: Never    Tobacco comments:     Pt sts 1 every once and awhile 11/28/22   Vaping Use    Vaping status: Every Day    Substances: Nicotine, Flavoring    Devices: Disposable, Refillable tank   Substance and Sexual Activity    Alcohol use: Not Currently    Drug use: Never    Sexual activity: Not Currently     Birth control/protection: Implant   Other Topics Concern    Not on file   Social History Narrative    Not on file     Social Drivers  "of Health     Financial Resource Strain: Low Risk  (11/25/2022)    Overall Financial Resource Strain (CARDIA)     Difficulty of Paying Living Expenses: Not hard at all   Food Insecurity: No Food Insecurity (11/25/2022)    Hunger Vital Sign     Worried About Running Out of Food in the Last Year: Never true     Ran Out of Food in the Last Year: Never true   Transportation Needs: No Transportation Needs (11/25/2022)    PRAPARE - Transportation     Lack of Transportation (Medical): No     Lack of Transportation (Non-Medical): No   Physical Activity: Sufficiently Active (11/25/2022)    Exercise Vital Sign     Days of Exercise per Week: 3 days     Minutes of Exercise per Session: 50 min   Stress: No Stress Concern Present (11/25/2022)    [place] [employer] - Occupational Stress Questionnaire     Feeling of Stress : Only a little   Social Connections: Socially Isolated (11/25/2022)    Social Connection and Isolation Panel [NHANES]     Frequency of Communication with Friends and Family: More than three times a week     Frequency of Social Gatherings with Friends and Family: Patient declined     Attends [employer] Services: Never     Active Member of Clubs or Organizations: No     Attends Club or Organization Meetings: Never     Marital Status: Never    Intimate Partner Violence: Not on file   Housing Stability: Low Risk  (11/25/2022)    Housing Stability Vital Sign     Unable to Pay for Housing in the Last Year: No     Number of Places Lived in the Last Year: 1     Unstable Housing in the Last Year: No         EXAMINATION     Physical Exam:   Vitals: /80   Pulse 65   Temp 36.1 °C (97 °F) (Temporal)   Ht 1.549 m (5' 1\")   Wt 88.9 kg (195 lb 15.8 oz)   SpO2 100%     Constitutional:   Body Habitus: Body mass index is 37.03 kg/m².  Cooperation: Fully cooperates with exam  Appearance: Well-groomed, well-nourished  Respiratory:  Breathing comfortable on room air, no audible " wheezing  Cardiovascular: Skin appears well-perfused  Psychiatric: Appropriate affect  Gait: normal gait, no use of ambulatory device, nonantalgic.       MEDICAL DECISION MAKING    DATA    Labs:   Lab Results   Component Value Date/Time    SODIUM 141 03/25/2025 10:55 AM    POTASSIUM 4.0 03/25/2025 10:55 AM    CHLORIDE 108 03/25/2025 10:55 AM    CO2 23 03/25/2025 10:55 AM    GLUCOSE 92 03/25/2025 10:55 AM    BUN 7 (L) 03/25/2025 10:55 AM    CREATININE 0.73 03/25/2025 10:55 AM        Lab Results   Component Value Date/Time    WBC 5.7 03/25/2025 10:55 AM    RBC 4.47 03/25/2025 10:55 AM    HEMOGLOBIN 13.2 03/25/2025 10:55 AM    HEMATOCRIT 40.2 03/25/2025 10:55 AM    MCV 89.9 03/25/2025 10:55 AM    MCH 29.5 03/25/2025 10:55 AM    MCHC 32.8 03/25/2025 10:55 AM    MPV 10.1 03/25/2025 10:55 AM    NEUTSPOLYS 58.10 03/25/2025 10:55 AM    LYMPHOCYTES 33.10 03/25/2025 10:55 AM    MONOCYTES 7.00 03/25/2025 10:55 AM    EOSINOPHILS 0.90 03/25/2025 10:55 AM    BASOPHILS 0.50 03/25/2025 10:55 AM        Lab Results   Component Value Date/Time    HBA1C 5.4 03/25/2025 10:55 AM          Imaging:   I reviewed the following radiology reports  MRI Right Wrist 1/13/2025:  FINDINGS:     There is a marker along the medial aspect of the wrist.     ALIGNMENT: Normal.     JOINTS:  Effusion: No effusion.  First carpometacarpal joint:No arthropathy detected.  Triscaphe joint:No arthropathy detected.  Radio-scaphoid articulation: Normal.     BONES:  No fractures or osseous contusions.  No flexion deformity of the scaphoid.     Ligaments:  - Scapholunate ligament: Intact  - Lunotriquetral ligament: Intact  - Dorsal capsule: No synovitis. No ganglion cyst.  - Volar radiocarpal extrinsic ligament: Intact.  - Triangular fibrocartilage: Intact TFC disc. Normal foveal and ulnar styloid attachment.  - Distal radioulnar joint effusion: Trace fluid.        EXTENSOR COMPARTMENT:  - I (Abductor pollicis longus, extensor pollicis brevis): Normal  - II  (Extensor carpi radialis brevis and longus): Normal  - III (Extensor pollicis longus): Normal  - IV (Extensor digitorum and indicis): Normal  - V (Extensor digiti minimi): Normal  - VI (Extensor carpi ulnaris): Mildly thickened     FLEXOR COMPARTMENT:  - Carpal tunnel: Normal.  - Flexor retinaculum: Intact.  - Flexor tendons: Normal  -Median nerve: Normal signal and appearance throughout the carpal tunnel.     [place]on canal: Normal.     Muscle: Normal thenar and hyperthenar musculature.  __________________________________     IMPRESSION:        1. Mild tendinosis of the extensor carpi ulnaris.  2. No osteoarthritis. No ligamentous injury.      DIAGNOSIS   Visit Diagnoses     ICD-10-CM   1. Extensor carpi ulnaris tendinitis  M77.8   2. Right wrist pain  M25.531         ASSESSMENT and PLAN:     Hannah Winters is a 39 y.o. female who returns to clinic for follow-up of right wrist pain.    Diagnoses and all orders for this visit:    Extensor carpi ulnaris tendinitis    Right wrist pain      Assessment & Plan  Right wrist pain  Right extensor carpi ulnaris tendinitis  Patient presents for follow-up of chronic right wrist pain in the setting of right extensor carpi ulnaris tendinitis. She has been managing the pain with Tylenol and ibuprofen, which appears to be effective. She was advised to continue with the recommended exercises and stretches from hand therapy at least three times weekly to minimize the risk of symptom recurrence. The potential benefits and risks of steroid injections were discussed, including the increased risk of tendon weakening and rupture. She was instructed to limit her daily intake of Tylenol to no more than 3000 mg and ibuprofen to no more than 2400 mg. If the pain becomes more persistent and bothersome, she should contact the clinic for consideration of a steroid injection.      Follow up: As needed    Thank you for allowing me to participate in the care of this patient. If you have any  questions please not hesitate to contact me.         Please note that this dictation was created using voice recognition software. I have made every reasonable attempt to correct obvious errors but there may be errors of grammar and content that I may have overlooked prior to finalization of this note.    Estelita Rubio MD  Interventional Spine and Sports Physiatry  Physical Medicine and Rehabilitation  [facility]

## 2025-05-13 ENCOUNTER — APPOINTMENT (OUTPATIENT)
Dept: PHYSICAL MEDICINE AND REHAB | Facility: MEDICAL CENTER | Age: 40
End: 2025-05-13
Payer: COMMERCIAL

## 2025-05-13 VITALS
DIASTOLIC BLOOD PRESSURE: 80 MMHG | HEIGHT: 61 IN | BODY MASS INDEX: 37 KG/M2 | WEIGHT: 195.99 LBS | TEMPERATURE: 97 F | OXYGEN SATURATION: 100 % | HEART RATE: 65 BPM | SYSTOLIC BLOOD PRESSURE: 128 MMHG

## 2025-05-13 DIAGNOSIS — M77.8 EXTENSOR CARPI ULNARIS TENDINITIS: Primary | ICD-10-CM

## 2025-05-13 DIAGNOSIS — M25.531 RIGHT WRIST PAIN: ICD-10-CM

## 2025-05-13 PROCEDURE — 3079F DIAST BP 80-89 MM HG: CPT | Performed by: STUDENT IN AN ORGANIZED HEALTH CARE EDUCATION/TRAINING PROGRAM

## 2025-05-13 PROCEDURE — 1125F AMNT PAIN NOTED PAIN PRSNT: CPT | Performed by: STUDENT IN AN ORGANIZED HEALTH CARE EDUCATION/TRAINING PROGRAM

## 2025-05-13 PROCEDURE — 99213 OFFICE O/P EST LOW 20 MIN: CPT | Performed by: STUDENT IN AN ORGANIZED HEALTH CARE EDUCATION/TRAINING PROGRAM

## 2025-05-13 PROCEDURE — 3074F SYST BP LT 130 MM HG: CPT | Performed by: STUDENT IN AN ORGANIZED HEALTH CARE EDUCATION/TRAINING PROGRAM

## 2025-05-13 ASSESSMENT — PAIN SCALES - GENERAL: PAINLEVEL_OUTOF10: 1=MINIMAL PAIN

## 2025-05-13 ASSESSMENT — PATIENT HEALTH QUESTIONNAIRE - PHQ9: CLINICAL INTERPRETATION OF PHQ2 SCORE: 0

## 2025-05-13 ASSESSMENT — FIBROSIS 4 INDEX: FIB4 SCORE: 0.55

## 2025-05-28 ENCOUNTER — APPOINTMENT (OUTPATIENT)
Dept: SLEEP MEDICINE | Facility: MEDICAL CENTER | Age: 40
End: 2025-05-28
Attending: FAMILY MEDICINE
Payer: COMMERCIAL

## 2025-06-17 ENCOUNTER — APPOINTMENT (OUTPATIENT)
Dept: SLEEP MEDICINE | Facility: MEDICAL CENTER | Age: 40
End: 2025-06-17
Attending: FAMILY MEDICINE
Payer: COMMERCIAL

## 2025-06-17 DIAGNOSIS — R53.83 OTHER FATIGUE: ICD-10-CM

## 2025-06-17 PROCEDURE — 95800 SLP STDY UNATTENDED: CPT | Mod: 26 | Performed by: STUDENT IN AN ORGANIZED HEALTH CARE EDUCATION/TRAINING PROGRAM

## 2025-06-24 NOTE — PROCEDURES
DIAGNOSTIC HOME SLEEP TEST (HST) REPORT WatchPAT      PATIENT ID:  NAME:  Hannah Winters  MRN:               [id number]  YOB: 1985  DATE OF STUDY: 6/18/2025      Impression:     This study shows evidence of:      1. Mild obstructive sleep apnea with 3% PAT apnea hypopnea index(pAHI) of 5.2 per hour.  PAT respiratory disturbance index (pRDI) was 10 per hour. These findings are based on 7 channels recording of PAT signal with sleep staging, heart rate, pulse oximetry, actigraphy, body position, snoring and respiratory movement.     2. Oxygenation O2 Sat. mean O2 sat was 93%,  chan was 89%,  and maximum O2 at 98 %. O2 sat was at or  below 88% for 0 min of evaluation time. Oxygen Desaturation (>=4%) Index was 1.2/hr. AVG HR was 54 BPM.      TECHNICAL DESCRIPTION: Patient underwent home sleep apnea testing with peripheral arterial tone signal (WatchPAT™). This is a Type IV portable monitor and device per Medicare. Monitoring was done with 7 channels recording of PAT signal with sleep staging, heart rate, pulse oximetry, actigraphy, body position, snoring and respiratory movement. Prior to using the device, the patient received verbal and written instructions for its application and was provided with the help desk phone number for additional telephonic instruction with 24-hour availability of qualified personnel to answer questions.    Respiratory events:          General sleep summary: . Total recording time is 6 hours and 46 minutes and total Sleep time is 6 hours and 3 minutes. The patient spent 258 minutes in the supine position and 106 minutes in the nonsupine position.      Recommendations:    1. CPAP titration study vs Auto CPAP trial.  If starting auto CPAP would recommend minimum pressure 4 cmH2O and maximum pressure 10 cmH2O.    2. In general patients with sleep apnea are advised to avoid alcohol and sedatives and to not operate a motor vehicle while drowsy. In some cases  alternative treatment options may prove effective in resolving sleep apnea in these options include upper airway surgery, the use of a dental orthotic or weight loss and positional therapy. Clinical correlation is required.

## 2025-07-14 ENCOUNTER — OFFICE VISIT (OUTPATIENT)
Dept: URGENT CARE | Facility: PHYSICIAN GROUP | Age: 40
End: 2025-07-14
Payer: COMMERCIAL

## 2025-07-14 VITALS
DIASTOLIC BLOOD PRESSURE: 68 MMHG | RESPIRATION RATE: 16 BRPM | WEIGHT: 195 LBS | BODY MASS INDEX: 36.82 KG/M2 | SYSTOLIC BLOOD PRESSURE: 126 MMHG | OXYGEN SATURATION: 98 % | TEMPERATURE: 97.1 F | HEIGHT: 61 IN | HEART RATE: 62 BPM

## 2025-07-14 DIAGNOSIS — R30.0 DYSURIA: ICD-10-CM

## 2025-07-14 LAB
APPEARANCE UR: NORMAL
BILIRUB UR STRIP-MCNC: NORMAL MG/DL
COLOR UR AUTO: YELLOW
GLUCOSE UR STRIP.AUTO-MCNC: NORMAL MG/DL
KETONES UR STRIP.AUTO-MCNC: NORMAL MG/DL
LEUKOCYTE ESTERASE UR QL STRIP.AUTO: NORMAL
NITRITE UR QL STRIP.AUTO: NORMAL
PH UR STRIP.AUTO: 8 [PH] (ref 5–8)
POCT INT CON NEG: NEGATIVE
POCT INT CON POS: POSITIVE
POCT URINE PREGNANCY TEST: NEGATIVE
PROT UR QL STRIP: NORMAL MG/DL
RBC UR QL AUTO: NORMAL
SP GR UR STRIP.AUTO: 1.01
UROBILINOGEN UR STRIP-MCNC: 0.2 MG/DL

## 2025-07-14 PROCEDURE — 3078F DIAST BP <80 MM HG: CPT

## 2025-07-14 PROCEDURE — 81002 URINALYSIS NONAUTO W/O SCOPE: CPT

## 2025-07-14 PROCEDURE — 81025 URINE PREGNANCY TEST: CPT

## 2025-07-14 PROCEDURE — 3074F SYST BP LT 130 MM HG: CPT

## 2025-07-14 PROCEDURE — 99213 OFFICE O/P EST LOW 20 MIN: CPT

## 2025-07-14 RX ORDER — CEFDINIR 300 MG/1
300 CAPSULE ORAL EVERY 12 HOURS
Qty: 10 CAPSULE | Refills: 0 | Status: SHIPPED | OUTPATIENT
Start: 2025-07-14 | End: 2025-07-19

## 2025-07-14 ASSESSMENT — ENCOUNTER SYMPTOMS
CHILLS: 0
FEVER: 0
NAUSEA: 0
VOMITING: 0
FLANK PAIN: 1

## 2025-07-14 ASSESSMENT — FIBROSIS 4 INDEX: FIB4 SCORE: 0.56

## 2025-07-14 NOTE — PROGRESS NOTES
"Subjective     Hannah Winters is a 40 y.o. female who presents with Dysuria (Urgency, and pressure. Sx 1 wk has been taking old amox for sx )            Dysuria   This is a new problem. The current episode started 1 to 4 weeks ago (1 week ago). The problem occurs every urination. The pain is mild. There has been no fever. She is Not sexually active. There is No history of pyelonephritis. Associated symptoms include a discharge, flank pain, frequency, hesitancy and urgency. Pertinent negatives include no chills, hematuria, nausea, possible pregnancy or vomiting. She has tried antibiotics (Started taking Augmentin 2 days ago, total 5 dose) for the symptoms.       Review of Systems   Constitutional:  Negative for chills and fever.   Gastrointestinal:  Negative for nausea and vomiting.   Genitourinary:  Positive for dysuria, flank pain, frequency, hesitancy and urgency. Negative for hematuria.              Objective     /68   Pulse 62   Temp 36.2 °C (97.1 °F) (Temporal)   Resp 16   Ht 1.549 m (5' 1\")   Wt 88.5 kg (195 lb)   SpO2 98%   BMI 36.84 kg/m²      Physical Exam  Constitutional:       General: She is not in acute distress.     Appearance: Normal appearance. She is not ill-appearing.   HENT:      Head: Normocephalic and atraumatic.      Right Ear: Tympanic membrane is not erythematous.      Left Ear: Tympanic membrane is not erythematous.      Nose: No congestion.      Mouth/Throat:      Mouth: Mucous membranes are moist.   Eyes:      Extraocular Movements: Extraocular movements intact.      Conjunctiva/sclera: Conjunctivae normal.      Pupils: Pupils are equal, round, and reactive to light.   Cardiovascular:      Rate and Rhythm: Normal rate and regular rhythm.   Pulmonary:      Effort: Pulmonary effort is normal. No respiratory distress.      Breath sounds: No stridor. No wheezing.   Abdominal:      General: There is no distension.      Tenderness: There is no abdominal tenderness. There is " no right CVA tenderness, left CVA tenderness or guarding.   Musculoskeletal:         General: Normal range of motion.   Skin:     General: Skin is dry.   Neurological:      General: No focal deficit present.      Mental Status: She is alert and oriented to person, place, and time. Mental status is at baseline.      Motor: No weakness.                                  Assessment & Plan  Dysuria    Orders:    POCT Urinalysis    POCT PREGNANCY    cefdinir (OMNICEF) 300 MG Cap; Take 1 Capsule by mouth every 12 hours for 5 days.       Results for orders placed or performed in visit on 07/14/25   POCT Urinalysis    Collection Time: 07/14/25 11:49 AM   Result Value Ref Range    POC Color yellow Negative    POC Appearance turbid Negative    POC Glucose neg Negative mg/dL    POC Bilirubin neg Negative mg/dL    POC Ketones neg Negative mg/dL    POC Specific Gravity 1.015 <1.005 - >1.030    POC Blood neg Negative    POC Urine PH 8.0 5.0 - 8.0    POC Protein neg Negative mg/dL    POC Urobiligen 0.2 Negative (0.2) mg/dL    POC Nitrites neg Negative    POC Leukocyte Esterase neg Negative   POCT PREGNANCY    Collection Time: 07/14/25 11:49 AM   Result Value Ref Range    POC Urine Pregnancy Test Negative     Internal Control Positive Positive     Internal Control Negative Negative         This is an acute condition.  Previous visits, pmhx, medication, and VS reviewed.  Patient not acutely ill appearing or in acute distress. VSS.    POCT urinalysis without leuk esterase, nitrates, or blood.  Patient reporting she has been taking previously prescribed Augmentin for the last 2 days, and has gotten a total of 5 doses, her last dose was this morning.  This could explain her negative UA.  However will treat empirically for UTI with targeted antibiotic, encouraged to stop taking Augmentin.  HPI and physical exam findings are consistent with acute urinary tract infection.  Low suspicion at this time for pyelonephritis or renal  calculi.  She was  started on Cefdinir  Prescription was sent in to preferred pharmacy.  Patient educated on red flags of UTI and encouraged to seek care back in UC within 3 to 5 days if symptoms do not improve, or ER for  fever, chills, flank pain, or worsening symptoms.     Patient understands and is agreeable to treatment plan.  Denies further questions.       Please note that this dictation was created using voice recognition software. I have made every reasonable attempt to correct obvious errors, but I expect that there are errors of grammar and possibly content that I did not discover before finalizing the note.

## 2025-07-23 ENCOUNTER — OFFICE VISIT (OUTPATIENT)
Dept: MEDICAL GROUP | Facility: CLINIC | Age: 40
End: 2025-07-23
Payer: COMMERCIAL

## 2025-07-23 VITALS
SYSTOLIC BLOOD PRESSURE: 121 MMHG | TEMPERATURE: 97.3 F | BODY MASS INDEX: 35.29 KG/M2 | WEIGHT: 186.9 LBS | HEIGHT: 61 IN | DIASTOLIC BLOOD PRESSURE: 74 MMHG | OXYGEN SATURATION: 96 % | HEART RATE: 63 BPM

## 2025-07-23 DIAGNOSIS — G47.01 INSOMNIA DUE TO MEDICAL CONDITION: ICD-10-CM

## 2025-07-23 DIAGNOSIS — G47.33 OSA (OBSTRUCTIVE SLEEP APNEA): ICD-10-CM

## 2025-07-23 DIAGNOSIS — Z23 NEED FOR VACCINATION: Primary | ICD-10-CM

## 2025-07-23 DIAGNOSIS — H69.91 DISORDER OF RIGHT EUSTACHIAN TUBE: ICD-10-CM

## 2025-07-23 PROCEDURE — 99213 OFFICE O/P EST LOW 20 MIN: CPT | Performed by: FAMILY MEDICINE

## 2025-07-23 PROCEDURE — 3074F SYST BP LT 130 MM HG: CPT | Performed by: FAMILY MEDICINE

## 2025-07-23 PROCEDURE — 3078F DIAST BP <80 MM HG: CPT | Performed by: FAMILY MEDICINE

## 2025-07-23 RX ORDER — CYCLOBENZAPRINE HCL 10 MG
10 TABLET ORAL 3 TIMES DAILY PRN
Qty: 30 TABLET | Refills: 2 | Status: SHIPPED | OUTPATIENT
Start: 2025-07-23

## 2025-07-23 ASSESSMENT — FIBROSIS 4 INDEX: FIB4 SCORE: 0.56

## 2025-07-23 NOTE — ASSESSMENT & PLAN NOTE
Reviewed home sleep study  Offered APAP vs sleep titration study  Patient worried about PAP therapy due to her sleep habits  Open to discussing results further with sleep medicine specialist after seeing ENT  Gave number for ENT referral already ordered on chart.

## 2025-07-23 NOTE — PROGRESS NOTES
"Subjective:     CC: review sleep study results    HPI:   Hannah presents today with:    Weight management follow-up  -picked up phentermine (filled #30 on 4/11), never started it    DUC:  Reviewed home sleep study which showed \"mild obstructive sleep apnea\" with a pAHI of 5.2 per hour.  Could consider CPAP titration study vs Auto CPAP trial with minimum pressure 4 cmH2O and maximum pressure 10 cmH2O.    Sometimes takes cyclobenzaprine at night for R leg spasm which helps her sleep.  Doesn't take it daily. Refilled today    Has dentures  Tosses and turns at night, worried about the PAP machine with her tossing and turning     Worried about R neck prominence  Has had negative US in 2022 and had recent thyroid imaging this year ordered by OBGYN     Never heard back about ENT referral  Ear doing better but would like to see them still to discuss the ear and neck concern as well as her mild DUC  Wrote down referral number      Current Medications and Prescriptions Ordered in Epic[1]    Past Medical History[2]     Past Surgical History[3]     Family History   Problem Relation Age of Onset    Breast Cancer Mother     Diabetes Father     No Known Problems Brother     No Known Problems Brother     No Known Problems Maternal Grandmother     Cancer Maternal Grandfather     No Known Problems Paternal Grandmother     No Known Problems Paternal Grandfather     No Known Problems Daughter     No Known Problems Son           ROS:  +fatigue  +neck asymmetry  Weight stable      Objective:     Exam:  /74 (BP Location: Left arm, Patient Position: Sitting, BP Cuff Size: Adult)   Pulse 63   Temp 36.3 °C (97.3 °F) (Temporal)   Ht 1.549 m (5' 1\")   Wt 84.8 kg (186 lb 14.4 oz)   SpO2 96%   BMI 35.31 kg/m²  Body mass index is 35.31 kg/m².    Gen: Alert and oriented, No apparent distress.  Head:  NCAT, EOMI, sclera clear without discharge  Neck: Neck is supple without discrete lymphadenopathy.  Lungs: Normal effort, CTA " bilaterally, no wheezes, rhonchi, or rales  CV: Regular rate and rhythm. No murmurs, rubs, or gallops.  Abd:   Non-distended, soft  Ext: No clubbing, cyanosis, edema.  MSK: Unassisted gait  Derm: No lesions on exposed skin  Psych: normal mood and affect        Assessment & Plan:     40 y.o. female with the following -     Problem List Items Addressed This Visit       Insomnia due to medical condition    Rx refilled for occasional cyclobenzaprine for muscle cramp at night  DUC management as below         Disorder of right eustachian tube    Ear doing better at this time  Still wants to see ENT to discuss ear, R neck prominence compared to the L and her new diagnosis of mild DUC  Wrote down the referral phone number for her to call         DUC (obstructive sleep apnea)    Reviewed home sleep study  Offered APAP vs sleep titration study  Patient worried about PAP therapy due to her sleep habits  Open to discussing results further with sleep medicine specialist after seeing ENT  Gave number for ENT referral already ordered on chart.          Relevant Orders    Referral to Pulmonary and Sleep Medicine     Other Visit Diagnoses         Need for vaccination    -  Primary              Follow-up: PRN             [1]   Current Outpatient Medications Ordered in Epic   Medication Sig Dispense Refill    cyclobenzaprine (FLEXERIL) 10 MG Tab Take 1 Tablet by mouth 3 times a day as needed for Muscle Spasms. 30 Tablet 2    naproxen (NAPROSYN) 500 MG Tab Take 500 mg by mouth 2 times a day as needed. PAIN (Patient not taking: Reported on 7/14/2025)       No current [facility]-ordered facility-administered medications on file.   [2] No past medical history on file.  [3]   Past Surgical History:  Procedure Laterality Date    TUBAL LIGATION Bilateral 02/26/2025    PRIMARY C SECTION  08/2018    PRIMARY C SECTION  12/2006

## 2025-07-27 NOTE — ASSESSMENT & PLAN NOTE
Ear doing better at this time  Still wants to see ENT to discuss ear, R neck prominence compared to the L and her new diagnosis of mild DUC  Wrote down the referral phone number for her to call